# Patient Record
Sex: FEMALE | Race: WHITE | NOT HISPANIC OR LATINO | URBAN - METROPOLITAN AREA
[De-identification: names, ages, dates, MRNs, and addresses within clinical notes are randomized per-mention and may not be internally consistent; named-entity substitution may affect disease eponyms.]

---

## 2019-08-09 ENCOUNTER — APPOINTMENT (OUTPATIENT)
Dept: WOMENS IMAGING | Facility: HOSPITAL | Age: 59
End: 2019-08-09

## 2019-08-09 PROCEDURE — 77067 SCR MAMMO BI INCL CAD: CPT | Performed by: RADIOLOGY

## 2022-03-30 RX ORDER — LOSARTAN POTASSIUM 100 MG/1
TABLET ORAL
Qty: 90 TABLET | Refills: 1 | OUTPATIENT
Start: 2022-03-30

## 2022-03-30 RX ORDER — ATORVASTATIN CALCIUM 10 MG/1
TABLET, FILM COATED ORAL
Qty: 90 TABLET | Refills: 1 | OUTPATIENT
Start: 2022-03-30

## 2022-04-05 RX ORDER — AMLODIPINE BESYLATE 2.5 MG/1
TABLET ORAL
Qty: 90 TABLET | Refills: 0 | Status: SHIPPED | OUTPATIENT
Start: 2022-04-05 | End: 2022-07-13

## 2022-04-05 RX ORDER — METOPROLOL SUCCINATE 25 MG/1
TABLET, EXTENDED RELEASE ORAL
Qty: 90 TABLET | Refills: 0 | Status: SHIPPED | OUTPATIENT
Start: 2022-04-05 | End: 2022-07-13

## 2022-04-18 RX ORDER — PROCHLORPERAZINE 25 MG/1
SUPPOSITORY RECTAL
Qty: 3 EACH | Refills: 1 | Status: SHIPPED | OUTPATIENT
Start: 2022-04-18 | End: 2022-06-09 | Stop reason: SDUPTHER

## 2022-04-18 RX ORDER — LOSARTAN POTASSIUM 100 MG/1
TABLET ORAL
Qty: 90 TABLET | Refills: 1 | OUTPATIENT
Start: 2022-04-18

## 2022-05-10 RX ORDER — ALBUTEROL SULFATE 90 UG/1
2 AEROSOL, METERED RESPIRATORY (INHALATION) EVERY 4 HOURS PRN
Qty: 18 G | Refills: 2 | Status: SHIPPED | OUTPATIENT
Start: 2022-05-10 | End: 2022-07-21

## 2022-05-10 RX ORDER — ALBUTEROL SULFATE 90 UG/1
AEROSOL, METERED RESPIRATORY (INHALATION)
Qty: 6.7 G | Refills: 1 | OUTPATIENT
Start: 2022-05-10

## 2022-05-16 DIAGNOSIS — F51.01 PRIMARY INSOMNIA: Primary | ICD-10-CM

## 2022-05-17 ENCOUNTER — TELEPHONE (OUTPATIENT)
Dept: FAMILY MEDICINE CLINIC | Facility: CLINIC | Age: 62
End: 2022-05-17

## 2022-05-17 NOTE — TELEPHONE ENCOUNTER
"Looks like it was sent over to [facility] on 04/05 for 90 days. Refill is too soon. Tried calling number and it was \"not in service\"  "

## 2022-05-17 NOTE — TELEPHONE ENCOUNTER
PT CALLED IN AND NEEDS A REFILL ON AMLODIPINE 2.5MG SENT OVER TO [facility]

## 2022-05-18 RX ORDER — ZOLPIDEM TARTRATE 10 MG/1
TABLET ORAL
Qty: 30 TABLET | Refills: 0 | Status: SHIPPED | OUTPATIENT
Start: 2022-05-18 | End: 2022-06-09 | Stop reason: SDUPTHER

## 2022-06-08 DIAGNOSIS — F51.01 PRIMARY INSOMNIA: ICD-10-CM

## 2022-06-08 NOTE — TELEPHONE ENCOUNTER
Incoming Refill Request      Medication requested (name and dose):   DEXCOM TRANSMITTER  DEXCOM SENSOR   AMBIEN   Pharmacy where request should be sent: [facility]     Additional details provided by patient:   DEXCOM EXPIRES IN 1-2 DAYS      Best call back number:   [phone]    Does the patient have less than a 3 day supply:  [x] Yes  [] No    Annamarie More Rep  06/08/22, 13:38 EDT

## 2022-06-09 DIAGNOSIS — F51.01 PRIMARY INSOMNIA: ICD-10-CM

## 2022-06-09 RX ORDER — PROCHLORPERAZINE 25 MG/1
SUPPOSITORY RECTAL
Qty: 3 EACH | Refills: 1 | Status: SHIPPED | OUTPATIENT
Start: 2022-06-09 | End: 2022-10-03 | Stop reason: SDUPTHER

## 2022-06-09 RX ORDER — PROCHLORPERAZINE 25 MG/1
SUPPOSITORY RECTAL
Qty: 3 EACH | Refills: 1 | Status: SHIPPED | OUTPATIENT
Start: 2022-06-09 | End: 2022-08-09 | Stop reason: SDUPTHER

## 2022-06-09 RX ORDER — PROCHLORPERAZINE 25 MG/1
SUPPOSITORY RECTAL
Qty: 1 EACH | Refills: 1 | Status: SHIPPED | OUTPATIENT
Start: 2022-06-09 | End: 2023-02-07 | Stop reason: SDUPTHER

## 2022-06-09 RX ORDER — ZOLPIDEM TARTRATE 10 MG/1
10 TABLET ORAL
Qty: 30 TABLET | Refills: 0 | OUTPATIENT
Start: 2022-06-09

## 2022-06-09 RX ORDER — ZOLPIDEM TARTRATE 10 MG/1
10 TABLET ORAL
Qty: 30 TABLET | Refills: 0 | Status: SHIPPED | OUTPATIENT
Start: 2022-06-09 | End: 2022-06-10 | Stop reason: SDUPTHER

## 2022-06-09 NOTE — TELEPHONE ENCOUNTER
Ofe Mcfarlane, would you care to send in a refill of this patient's Ambien.  Cruz is appropriate.  We also called her pharmacy and got the dates.  I am going to be out next week so I put in that she can feel it after 6/18/2022 which is a month after her last fill.  Thanks

## 2022-06-10 DIAGNOSIS — F51.01 PRIMARY INSOMNIA: ICD-10-CM

## 2022-06-13 RX ORDER — ZOLPIDEM TARTRATE 10 MG/1
10 TABLET ORAL
Qty: 30 TABLET | Refills: 0 | Status: SHIPPED | OUTPATIENT
Start: 2022-06-13 | End: 2022-07-21

## 2022-07-13 RX ORDER — METOPROLOL SUCCINATE 25 MG/1
TABLET, EXTENDED RELEASE ORAL
Qty: 90 TABLET | Refills: 0 | Status: SHIPPED | OUTPATIENT
Start: 2022-07-13 | End: 2022-10-03

## 2022-07-13 RX ORDER — AMLODIPINE BESYLATE 2.5 MG/1
TABLET ORAL
Qty: 90 TABLET | Refills: 0 | Status: SHIPPED | OUTPATIENT
Start: 2022-07-13 | End: 2022-10-14

## 2022-07-20 DIAGNOSIS — F51.01 PRIMARY INSOMNIA: ICD-10-CM

## 2022-07-21 RX ORDER — ALBUTEROL SULFATE 90 UG/1
AEROSOL, METERED RESPIRATORY (INHALATION)
Qty: 18 G | Refills: 2 | Status: SHIPPED | OUTPATIENT
Start: 2022-07-21 | End: 2022-10-03 | Stop reason: SDUPTHER

## 2022-07-21 RX ORDER — ZOLPIDEM TARTRATE 10 MG/1
TABLET ORAL
Qty: 30 TABLET | Refills: 0 | Status: SHIPPED | OUTPATIENT
Start: 2022-07-21 | End: 2022-07-29 | Stop reason: SDUPTHER

## 2022-07-25 DIAGNOSIS — F51.01 PRIMARY INSOMNIA: ICD-10-CM

## 2022-07-25 NOTE — TELEPHONE ENCOUNTER
Caller: Stephani Ye    Relationship to patient: Self    Best call back number: [phone]    Patient is needing: PATIENT STATED [facility] [place] WILL NOT [facility]SFER THE AMBIEN MEDICATION SO THAT PROVIDER WOULD NEED TO SEND IT TO [id number]     PLEASE ADVISE

## 2022-07-27 DIAGNOSIS — F51.01 PRIMARY INSOMNIA: ICD-10-CM

## 2022-07-27 RX ORDER — ZOLPIDEM TARTRATE 10 MG/1
10 TABLET ORAL
Qty: 30 TABLET | Refills: 0 | Status: CANCELLED | OUTPATIENT
Start: 2022-07-27

## 2022-07-27 NOTE — TELEPHONE ENCOUNTER
Caller: Stephani Ye    Relationship: Self    Best call back number: [phone]    Requested Prescriptions:   Requested Prescriptions     Pending Prescriptions Disp Refills   • zolpidem (AMBIEN) 10 MG tablet 30 tablet 0     Sig: Take 1 tablet by mouth every night at bedtime.        Pharmacy where request should be sent: [facility]ACY #[id number] - [place], [place] - [address] [place] [address] [address] [facility] [phone] [facility] [phone] FX     Additional details provided by patient: PATIENT IS OUT OF THIS MEDICATION.    Does the patient have less than a 3 day supply:  [x] Yes  [] No    Annamarie Ibrahim Rep   07/27/22 13:49 EDT

## 2022-07-31 RX ORDER — ZOLPIDEM TARTRATE 10 MG/1
10 TABLET ORAL
Qty: 30 TABLET | Refills: 0 | Status: SHIPPED | OUTPATIENT
Start: 2022-07-31 | End: 2022-10-04 | Stop reason: SDUPTHER

## 2022-08-09 ENCOUNTER — TELEPHONE (OUTPATIENT)
Dept: FAMILY MEDICINE CLINIC | Facility: CLINIC | Age: 62
End: 2022-08-09

## 2022-08-09 RX ORDER — PROCHLORPERAZINE 25 MG/1
SUPPOSITORY RECTAL
Qty: 3 EACH | Refills: 1 | Status: SHIPPED | OUTPATIENT
Start: 2022-08-09

## 2022-08-09 NOTE — TELEPHONE ENCOUNTER
Caller: Stephani Ye    Relationship: Self    Best call back number: [phone]    Requested Prescriptions:   Continuous Blood Gluc Sensor (Dexcom G6 Sensor)     Pharmacy where request should be sent: [facility]/PHARMACY #[id number] - [place] [id number] [address] - [phone]  - [phone] FX     PLEASE REFILL.    Annamarie Austin Rep   08/09/22 10:35 EDT     THANK YOU.

## 2022-09-06 RX ORDER — SITAGLIPTIN 100 MG/1
TABLET, FILM COATED ORAL
Qty: 90 TABLET | Refills: 0 | Status: SHIPPED | OUTPATIENT
Start: 2022-09-06 | End: 2022-10-14

## 2022-10-02 DIAGNOSIS — F51.01 PRIMARY INSOMNIA: ICD-10-CM

## 2022-10-03 RX ORDER — ALBUTEROL SULFATE 90 UG/1
2 AEROSOL, METERED RESPIRATORY (INHALATION) EVERY 4 HOURS PRN
Qty: 18 G | Refills: 0 | Status: SHIPPED | OUTPATIENT
Start: 2022-10-03

## 2022-10-03 RX ORDER — INSULIN GLARGINE 100 [IU]/ML
INJECTION, SOLUTION SUBCUTANEOUS
Qty: 15 ML | Refills: 0 | Status: SHIPPED | OUTPATIENT
Start: 2022-10-03 | End: 2022-10-14 | Stop reason: SDUPTHER

## 2022-10-03 RX ORDER — LOSARTAN POTASSIUM 100 MG/1
TABLET ORAL
Qty: 90 TABLET | Refills: 0 | Status: SHIPPED | OUTPATIENT
Start: 2022-10-03 | End: 2022-10-03 | Stop reason: SDUPTHER

## 2022-10-03 RX ORDER — INSULIN GLARGINE 100 [IU]/ML
INJECTION, SOLUTION SUBCUTANEOUS
Qty: 9 ML | Refills: 1 | Status: SHIPPED | OUTPATIENT
Start: 2022-10-03 | End: 2022-10-03 | Stop reason: SDUPTHER

## 2022-10-03 RX ORDER — LOSARTAN POTASSIUM 100 MG/1
TABLET ORAL
Qty: 90 TABLET | OUTPATIENT
Start: 2022-10-03

## 2022-10-03 RX ORDER — METOPROLOL SUCCINATE 25 MG/1
25 TABLET, EXTENDED RELEASE ORAL DAILY
Qty: 30 TABLET | Refills: 0 | Status: SHIPPED | OUTPATIENT
Start: 2022-10-03 | End: 2022-10-14 | Stop reason: SDUPTHER

## 2022-10-03 RX ORDER — METOPROLOL SUCCINATE 25 MG/1
TABLET, EXTENDED RELEASE ORAL
Qty: 90 TABLET | Refills: 0 | Status: SHIPPED | OUTPATIENT
Start: 2022-10-03 | End: 2022-10-03 | Stop reason: SDUPTHER

## 2022-10-03 RX ORDER — LOSARTAN POTASSIUM 100 MG/1
100 TABLET ORAL DAILY
Qty: 30 TABLET | Refills: 0 | Status: SHIPPED | OUTPATIENT
Start: 2022-10-03 | End: 2022-10-14 | Stop reason: SDUPTHER

## 2022-10-03 RX ORDER — PROCHLORPERAZINE 25 MG/1
SUPPOSITORY RECTAL
Qty: 3 EACH | Refills: 1 | Status: SHIPPED | OUTPATIENT
Start: 2022-10-03 | End: 2023-02-07 | Stop reason: SDUPTHER

## 2022-10-03 RX ORDER — ATORVASTATIN CALCIUM 10 MG/1
10 TABLET, FILM COATED ORAL DAILY
Qty: 30 TABLET | Refills: 0 | Status: SHIPPED | OUTPATIENT
Start: 2022-10-03 | End: 2022-10-14 | Stop reason: SDUPTHER

## 2022-10-03 RX ORDER — ATORVASTATIN CALCIUM 10 MG/1
TABLET, FILM COATED ORAL
Qty: 90 TABLET | Refills: 1 | Status: SHIPPED | OUTPATIENT
Start: 2022-10-03 | End: 2022-10-03 | Stop reason: SDUPTHER

## 2022-10-03 NOTE — TELEPHONE ENCOUNTER
Appt scheduled 10/14/22    Needs:  Januvia needs to be changed. Was on Jardiance before and had to change it because of insurance. Now her new insurance doesn't want to cover Januvia. Can you prescribe something else?

## 2022-10-04 ENCOUNTER — TELEPHONE (OUTPATIENT)
Dept: FAMILY MEDICINE CLINIC | Facility: CLINIC | Age: 62
End: 2022-10-04

## 2022-10-04 DIAGNOSIS — F51.01 PRIMARY INSOMNIA: ICD-10-CM

## 2022-10-04 RX ORDER — ZOLPIDEM TARTRATE 10 MG/1
10 TABLET ORAL
Qty: 30 TABLET | Refills: 0 | Status: SHIPPED | OUTPATIENT
Start: 2022-10-04 | End: 2023-01-05 | Stop reason: SDUPTHER

## 2022-10-04 NOTE — TELEPHONE ENCOUNTER
Ofe Mcfarlane, you have been filling this.  Last UDS was in March but she has a follow-up appointment with me in 2 weeks.  We called and got dates of her Ambien refills and its appropriate.  Would you care to send a month's refill.  Thanks

## 2022-10-04 NOTE — TELEPHONE ENCOUNTER
Ambien fill dates    8-29-22 30 day- Dr Mfcarlane    7-30-22 30 day- Dr Mcfarlane    6-17-22 30 day- Dr Mcfarlane

## 2022-10-14 ENCOUNTER — TELEMEDICINE (OUTPATIENT)
Dept: FAMILY MEDICINE CLINIC | Facility: CLINIC | Age: 62
End: 2022-10-14

## 2022-10-14 DIAGNOSIS — Z79.4 TYPE 2 DIABETES MELLITUS WITH HYPERGLYCEMIA, WITH LONG-TERM CURRENT USE OF INSULIN: Primary | ICD-10-CM

## 2022-10-14 DIAGNOSIS — E78.2 MIXED HYPERLIPIDEMIA: ICD-10-CM

## 2022-10-14 DIAGNOSIS — F51.01 PRIMARY INSOMNIA: ICD-10-CM

## 2022-10-14 DIAGNOSIS — Z79.899 ENCOUNTER FOR LONG-TERM (CURRENT) USE OF OTHER MEDICATIONS: ICD-10-CM

## 2022-10-14 DIAGNOSIS — I10 BENIGN ESSENTIAL HTN: ICD-10-CM

## 2022-10-14 DIAGNOSIS — E11.65 TYPE 2 DIABETES MELLITUS WITH HYPERGLYCEMIA, WITH LONG-TERM CURRENT USE OF INSULIN: Primary | ICD-10-CM

## 2022-10-14 PROBLEM — G47.00 INSOMNIA: Status: ACTIVE | Noted: 2022-10-14

## 2022-10-14 RX ORDER — INSULIN GLARGINE 100 [IU]/ML
INJECTION, SOLUTION SUBCUTANEOUS
Qty: 15 ML | Refills: 2 | Status: SHIPPED | OUTPATIENT
Start: 2022-10-14 | End: 2022-12-29 | Stop reason: SDUPTHER

## 2022-10-14 RX ORDER — ATORVASTATIN CALCIUM 10 MG/1
10 TABLET, FILM COATED ORAL DAILY
Qty: 90 TABLET | Refills: 1 | Status: SHIPPED | OUTPATIENT
Start: 2022-10-14 | End: 2023-01-05 | Stop reason: SDUPTHER

## 2022-10-14 RX ORDER — METOPROLOL SUCCINATE 25 MG/1
25 TABLET, EXTENDED RELEASE ORAL DAILY
Qty: 90 TABLET | Refills: 1 | Status: SHIPPED | OUTPATIENT
Start: 2022-10-14 | End: 2023-01-05 | Stop reason: SDUPTHER

## 2022-10-14 RX ORDER — LOSARTAN POTASSIUM 100 MG/1
100 TABLET ORAL DAILY
Qty: 90 TABLET | Refills: 1 | Status: SHIPPED | OUTPATIENT
Start: 2022-10-14 | End: 2023-01-05 | Stop reason: SDUPTHER

## 2022-10-14 NOTE — ASSESSMENT & PLAN NOTE
Meds refilled.  We will try Tradjenta in place of the Januvia to see if insurance will cover this.  She will let me know if it is too expensive so that we can then possibly run a PA or try to find out what her insurance will cover.  Proper diet and exercise plan discussed and encouraged.  Annual dental and eye exams encouraged.  Check feet daily.  Goal blood pressure less than 140/90.  Let me know if gets to or above that.  Patient know she is due routine blood work.  States she will be back here in town in a couple weeks and will come by the office then to do blood work and to leave a urine sample that also includes a UDS since she is on Ambien.  Education provided.  Return to clinic or ED with any issues or concerns.

## 2022-10-14 NOTE — PROGRESS NOTES
You have chosen to receive care through a telehealth visit.  Do you consent to use a video/audio connection for your medical care today? Yes     This was an audio and video enabled telemedicine encounter.     Chief Complaint  Med Refill, Hypertension, Hyperlipidemia, Insomnia, and Diabetes    Subjective          Stephani Ye presents to [facility]  History of Present Illness     Patient scheduled virtual appointment for med refills.  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  She is on Ambien and she knows she is due for UDS and states she will be here around Halloween time so she will come back into the office to leave a urine sample for UDS and to also do fasting blood work.  Doing well on medications with no issues.  No dizziness no headache no chest pain no chest pressure no shortness of breath.    States blood pressure well controlled only on losartan and metoprolol so she is not taking the amlodipine.    States due to insurance issues that she has been off Januvia for about a week as she states it is too expensive.  She is asking if there is an alternative we can try that is cheaper.  She states her blood sugars are doing okay.  She was unable to give an exact number.  She is on 60 units of Lantus daily.  No feet issues.  States we discussed all preventative measures at a later appointment.    Objective   Vital Signs:   There were no vitals taken for this visit.    There is no height or weight on file to calculate BMI.    Review of Systems   Constitutional: Negative for chills, fatigue and fever.   Eyes: Negative for visual disturbance.   Respiratory: Negative for cough, shortness of breath and wheezing.    Cardiovascular: Negative for chest pain and palpitations.   Gastrointestinal: Negative for abdominal pain, constipation, diarrhea, nausea and vomiting.   Genitourinary: Negative for decreased urine volume, dysuria, frequency, hematuria and urgency.    Musculoskeletal: Negative for back pain.   Skin: Negative for rash and skin lesions.   Neurological: Negative for dizziness and headache.   Psychiatric/Behavioral: Negative.  Negative for suicidal ideas.       Past History:  Medical History: has a past medical history of Carpal tunnel syndrome (), Elevated blood pressure reading, Emphysema, unspecified (HCC), Glaucoma, and Pregnancy.   Surgical History: has a past surgical history that includes Carpal tunnel release (); Tubal ligation; Tonsillectomy; Hysterectomy; Cosmetic surgery;  section; and Breast surgery.   Family History: family history includes Cancer in an other family member; Coronary artery disease in her mother and another family member; Depression in her sister and another family member; Diabetes in her brother; Hypertension in her brother and mother; Osteoarthritis in her mother; Peripheral vascular disease in her mother.   Social History: reports that she has been smoking cigarettes. She has a 30.00 pack-year smoking history. She has never used smokeless tobacco. She reports current alcohol use. Drug use questions deferred to the physician.    PHQ-2 Depression Screening  Little interest or pleasure in doing things? 0-->not at all   Feeling down, depressed, or hopeless? 0-->not at all   PHQ-2 Total Score 0        PHQ-9 Depression Screening  Little interest or pleasure in doing things? 0-->not at all   Feeling down, depressed, or hopeless? 0-->not at all   Trouble falling or staying asleep, or sleeping too much?     Feeling tired or having little energy?     Poor appetite or overeating?     Feeling bad about yourself - or that you are a failure or have let yourself or your family down?     Trouble concentrating on things, such as reading the newspaper or watching television?     Moving or speaking so slowly that other people could have noticed? Or the opposite - being so fidgety or restless that you have been moving around a lot more  than usual?     Thoughts that you would be better off dead, or of hurting yourself in some way?     PHQ-9 Total Score 0   If you checked off any problems, how difficult have these problems made it for you to do your work, take care of things at home, or get along with other people?       PHQ-9 Total Score: 0      Patient screened positive for depression based on a PHQ-9 score of 0 on 10/14/2022. Follow-up recommendations include:       Current Outpatient Medications:   •  albuterol sulfate  (90 Base) MCG/ACT inhaler, Inhale 2 puffs Every 4 (Four) Hours As Needed for Wheezing., Disp: 18 g, Rfl: 0  •  atorvastatin (LIPITOR) 10 MG tablet, Take 1 tablet by mouth Daily., Disp: 90 tablet, Rfl: 1  •  Continuous Blood Gluc Sensor (Dexcom G6 Sensor), Check glucose 3-4 times daily and PRN; E11.9, Disp: 3 each, Rfl: 1  •  Continuous Blood Gluc Sensor (Dexcom G6 Sensor), Change sensor every 10 days, Disp: 3 each, Rfl: 1  •  Continuous Blood Gluc Transmit (Dexcom G6 Transmitter) misc, CHECK BLOOD SUGAR DAILY, Disp: 1 each, Rfl: 1  •  Insulin Glargine (Lantus SoloStar) 100 UNIT/ML injection pen, Inject 60 units daily subcutaneously as per insulin protocol every morning, Disp: 15 mL, Rfl: 2  •  losartan (COZAAR) 100 MG tablet, Take 1 tablet by mouth Daily., Disp: 90 tablet, Rfl: 1  •  metoprolol succinate XL (TOPROL-XL) 25 MG 24 hr tablet, Take 1 tablet by mouth Daily., Disp: 90 tablet, Rfl: 1  •  zolpidem (AMBIEN) 10 MG tablet, Take 1 tablet by mouth every night at bedtime., Disp: 30 tablet, Rfl: 0  •  linagliptin (Tradjenta) 5 MG tablet tablet, Take 1 tablet by mouth Daily., Disp: 90 tablet, Rfl: 1   (Not in a hospital admission)     Allergies: Patient has no known allergies.    Physical Exam  Constitutional:       Appearance: Normal appearance.   Pulmonary:      Effort: Pulmonary effort is normal.   Neurological:      General: No focal deficit present.      Mental Status: She is alert and oriented to person, place, and  time. Mental status is at baseline.   Psychiatric:         Mood and Affect: Mood normal.         Behavior: Behavior normal.         Thought Content: Thought content normal.         Judgment: Judgment normal.          Result Review :                   Assessment and Plan    Diagnoses and all orders for this visit:    1. Type 2 diabetes mellitus with hyperglycemia, with long-term current use of insulin (HCC) (Primary)  Assessment & Plan:  Meds refilled.  We will try Tradjenta in place of the Januvia to see if insurance will cover this.  She will let me know if it is too expensive so that we can then possibly run a PA or try to find out what her insurance will cover.  Proper diet and exercise plan discussed and encouraged.  Annual dental and eye exams encouraged.  Check feet daily.  Goal blood pressure less than 140/90.  Let me know if gets to or above that.  Patient know she is due routine blood work.  States she will be back here in town in a couple weeks and will come by the office then to do blood work and to leave a urine sample that also includes a UDS since she is on Ambien.  Education provided.  Return to clinic or ED with any issues or concerns.    Orders:  -     Hemoglobin A1c; Future  -     POC Microalbumin; Future  -     linagliptin (Tradjenta) 5 MG tablet tablet; Take 1 tablet by mouth Daily.  Dispense: 90 tablet; Refill: 1  -     Insulin Glargine (Lantus SoloStar) 100 UNIT/ML injection pen; Inject 60 units daily subcutaneously as per insulin protocol every morning  Dispense: 15 mL; Refill: 2    2. Mixed hyperlipidemia  -     Lipid Panel; Future  -     atorvastatin (LIPITOR) 10 MG tablet; Take 1 tablet by mouth Daily.  Dispense: 90 tablet; Refill: 1    3. Benign essential HTN  -     CBC & Differential; Future  -     Comprehensive Metabolic Panel; Future  -     TSH Rfx On Abnormal To Free T4; Future  -     POC Urinalysis Dipstick, Automated; Future  -     losartan (COZAAR) 100 MG tablet; Take 1 tablet by  mouth Daily.  Dispense: 90 tablet; Refill: 1  -     metoprolol succinate XL (TOPROL-XL) 25 MG 24 hr tablet; Take 1 tablet by mouth Daily.  Dispense: 90 tablet; Refill: 1    4. Encounter for long-term (current) use of other medications  -     POC Urine Drug Screen, Triage; Future    5. Primary insomnia                   Follow Up   Return in about 3 months (around 1/14/2023).  Patient was given instructions and counseling regarding her condition or for health maintenance advice. Please see specific information pulled into the AVS if appropriate.     RYLEY Long

## 2022-11-02 RX ORDER — FLUTICASONE PROPIONATE AND SALMETEROL 250; 50 UG/1; UG/1
POWDER RESPIRATORY (INHALATION)
Qty: 1 EACH | Refills: 5 | Status: SHIPPED | OUTPATIENT
Start: 2022-11-02 | End: 2023-01-05 | Stop reason: SDUPTHER

## 2022-12-29 DIAGNOSIS — Z79.4 TYPE 2 DIABETES MELLITUS WITH HYPERGLYCEMIA, WITH LONG-TERM CURRENT USE OF INSULIN: ICD-10-CM

## 2022-12-29 DIAGNOSIS — E11.65 TYPE 2 DIABETES MELLITUS WITH HYPERGLYCEMIA, WITH LONG-TERM CURRENT USE OF INSULIN: ICD-10-CM

## 2022-12-29 NOTE — TELEPHONE ENCOUNTER
Caller: MIO GERBER     Relationship: PATIENT     Best call back number: [phone]    Requested Prescriptions:   Requested Prescriptions     Pending Prescriptions Disp Refills   • Insulin Glargine (Lantus SoloStar) 100 UNIT/ML injection pen 15 mL 2     Sig: Inject 60 units daily subcutaneously as per insulin protocol every morning        Pharmacy where request should be sent: [facility]/PHARMACY #[id number] - [place], [place] - [address] [place] [address] [address] [facility] [phone] [facility] [phone] FX     Additional details provided by patient:     Does the patient have less than a 3 day supply:  [x] Yes  [] No    SiAnnamarie Mitchell Rep   12/29/22 13:46 EST

## 2022-12-30 DIAGNOSIS — Z79.4 TYPE 2 DIABETES MELLITUS WITH HYPERGLYCEMIA, WITH LONG-TERM CURRENT USE OF INSULIN: ICD-10-CM

## 2022-12-30 DIAGNOSIS — E11.65 TYPE 2 DIABETES MELLITUS WITH HYPERGLYCEMIA, WITH LONG-TERM CURRENT USE OF INSULIN: ICD-10-CM

## 2022-12-30 RX ORDER — INSULIN GLARGINE 100 [IU]/ML
INJECTION, SOLUTION SUBCUTANEOUS
Qty: 15 ML | Refills: 2 | Status: SHIPPED | OUTPATIENT
Start: 2022-12-30 | End: 2023-01-05 | Stop reason: SDUPTHER

## 2022-12-31 DIAGNOSIS — I10 BENIGN ESSENTIAL HTN: ICD-10-CM

## 2023-01-03 RX ORDER — LOSARTAN POTASSIUM 100 MG/1
TABLET ORAL
Qty: 90 TABLET | Refills: 1 | OUTPATIENT
Start: 2023-01-03

## 2023-01-03 RX ORDER — INSULIN GLARGINE 100 [IU]/ML
INJECTION, SOLUTION SUBCUTANEOUS
OUTPATIENT
Start: 2023-01-03

## 2023-01-05 ENCOUNTER — OFFICE VISIT (OUTPATIENT)
Dept: FAMILY MEDICINE CLINIC | Facility: CLINIC | Age: 63
End: 2023-01-05

## 2023-01-05 ENCOUNTER — PATIENT MESSAGE (OUTPATIENT)
Dept: FAMILY MEDICINE CLINIC | Facility: CLINIC | Age: 63
End: 2023-01-05

## 2023-01-05 VITALS
HEART RATE: 99 BPM | WEIGHT: 147.44 LBS | OXYGEN SATURATION: 96 % | HEIGHT: 64 IN | DIASTOLIC BLOOD PRESSURE: 86 MMHG | BODY MASS INDEX: 25.17 KG/M2 | TEMPERATURE: 98.7 F | SYSTOLIC BLOOD PRESSURE: 118 MMHG

## 2023-01-05 DIAGNOSIS — R82.90 NONSPECIFIC FINDING ON EXAMINATION OF URINE: ICD-10-CM

## 2023-01-05 DIAGNOSIS — Z79.4 TYPE 2 DIABETES MELLITUS WITH HYPERGLYCEMIA, WITH LONG-TERM CURRENT USE OF INSULIN: ICD-10-CM

## 2023-01-05 DIAGNOSIS — E11.65 TYPE 2 DIABETES MELLITUS WITH HYPERGLYCEMIA, WITH LONG-TERM CURRENT USE OF INSULIN: ICD-10-CM

## 2023-01-05 DIAGNOSIS — Z72.0 TOBACCO USE: ICD-10-CM

## 2023-01-05 DIAGNOSIS — Z12.11 SCREENING FOR COLON CANCER: ICD-10-CM

## 2023-01-05 DIAGNOSIS — Z00.00 ANNUAL PHYSICAL EXAM: Primary | ICD-10-CM

## 2023-01-05 DIAGNOSIS — F51.01 PRIMARY INSOMNIA: ICD-10-CM

## 2023-01-05 DIAGNOSIS — Z12.4 SCREENING FOR CERVICAL CANCER: ICD-10-CM

## 2023-01-05 DIAGNOSIS — Z79.899 ENCOUNTER FOR LONG-TERM (CURRENT) USE OF OTHER MEDICATIONS: ICD-10-CM

## 2023-01-05 DIAGNOSIS — H40.9 GLAUCOMA, UNSPECIFIED GLAUCOMA TYPE, UNSPECIFIED LATERALITY: ICD-10-CM

## 2023-01-05 DIAGNOSIS — I10 BENIGN ESSENTIAL HTN: ICD-10-CM

## 2023-01-05 DIAGNOSIS — E78.2 MIXED HYPERLIPIDEMIA: ICD-10-CM

## 2023-01-05 DIAGNOSIS — Z12.2 SCREENING FOR LUNG CANCER: ICD-10-CM

## 2023-01-05 DIAGNOSIS — J44.9 CHRONIC OBSTRUCTIVE PULMONARY DISEASE, UNSPECIFIED COPD TYPE: ICD-10-CM

## 2023-01-05 DIAGNOSIS — J01.00 ACUTE NON-RECURRENT MAXILLARY SINUSITIS: ICD-10-CM

## 2023-01-05 LAB
BILIRUB BLD-MCNC: NEGATIVE MG/DL
CLARITY, POC: CLEAR
COLOR UR: YELLOW
EXPIRATION DATE: ABNORMAL
GLUCOSE UR STRIP-MCNC: ABNORMAL MG/DL
KETONES UR QL: NEGATIVE
LEUKOCYTE EST, POC: NEGATIVE
Lab: ABNORMAL
NITRITE UR-MCNC: NEGATIVE MG/ML
PH UR: 7.5 [PH] (ref 5–8)
POC AMPHETAMINES: NEGATIVE
POC BARBITURATES: NEGATIVE
POC BENZODIAZEPHINES: NEGATIVE
POC COCAINE: NEGATIVE
POC METHADONE: NEGATIVE
POC METHAMPHETAMINE SCREEN URINE: NEGATIVE
POC MICROALBUMIN URINE: 50
POC OPIATES: NEGATIVE
POC OXYCODONE: NEGATIVE
POC PHENCYCLIDINE: NEGATIVE
POC PROPOXYPHENE: NEGATIVE
POC THC: NEGATIVE
POC TRICYCLIC ANTIDEPRESSANTS: NEGATIVE
PROT UR STRIP-MCNC: ABNORMAL MG/DL
RBC # UR STRIP: NEGATIVE /UL
SP GR UR: 1.01 (ref 1–1.03)
UROBILINOGEN UR QL: NORMAL

## 2023-01-05 PROCEDURE — 99213 OFFICE O/P EST LOW 20 MIN: CPT | Performed by: NURSE PRACTITIONER

## 2023-01-05 PROCEDURE — 80305 DRUG TEST PRSMV DIR OPT OBS: CPT | Performed by: NURSE PRACTITIONER

## 2023-01-05 PROCEDURE — 81003 URINALYSIS AUTO W/O SCOPE: CPT | Performed by: NURSE PRACTITIONER

## 2023-01-05 PROCEDURE — 99396 PREV VISIT EST AGE 40-64: CPT | Performed by: NURSE PRACTITIONER

## 2023-01-05 PROCEDURE — 82044 UR ALBUMIN SEMIQUANTITATIVE: CPT | Performed by: NURSE PRACTITIONER

## 2023-01-05 PROCEDURE — 36415 COLL VENOUS BLD VENIPUNCTURE: CPT | Performed by: NURSE PRACTITIONER

## 2023-01-05 RX ORDER — METOPROLOL SUCCINATE 25 MG/1
25 TABLET, EXTENDED RELEASE ORAL DAILY
Qty: 90 TABLET | Refills: 1 | Status: SHIPPED | OUTPATIENT
Start: 2023-01-05

## 2023-01-05 RX ORDER — AMOXICILLIN AND CLAVULANATE POTASSIUM 875; 125 MG/1; MG/1
1 TABLET, FILM COATED ORAL 2 TIMES DAILY
Qty: 20 TABLET | Refills: 0 | Status: SHIPPED | OUTPATIENT
Start: 2023-01-05 | End: 2023-01-15

## 2023-01-05 RX ORDER — DORZOLAMIDE HYDROCHLORIDE AND TIMOLOL MALEATE 20; 5 MG/ML; MG/ML
SOLUTION/ DROPS OPHTHALMIC EVERY 12 HOURS
COMMUNITY

## 2023-01-05 RX ORDER — ZOLPIDEM TARTRATE 10 MG/1
10 TABLET ORAL
Qty: 30 TABLET | Refills: 2 | Status: SHIPPED | OUTPATIENT
Start: 2023-01-05

## 2023-01-05 RX ORDER — LOSARTAN POTASSIUM 100 MG/1
100 TABLET ORAL DAILY
Qty: 90 TABLET | Refills: 1 | Status: SHIPPED | OUTPATIENT
Start: 2023-01-05

## 2023-01-05 RX ORDER — INSULIN GLARGINE 100 [IU]/ML
INJECTION, SOLUTION SUBCUTANEOUS
Qty: 15 ML | Refills: 2 | Status: SHIPPED | OUTPATIENT
Start: 2023-01-05

## 2023-01-05 RX ORDER — FLUTICASONE PROPIONATE AND SALMETEROL 250; 50 UG/1; UG/1
1 POWDER RESPIRATORY (INHALATION)
Qty: 1 EACH | Refills: 5 | Status: SHIPPED | OUTPATIENT
Start: 2023-01-05

## 2023-01-05 RX ORDER — ATORVASTATIN CALCIUM 10 MG/1
10 TABLET, FILM COATED ORAL DAILY
Qty: 90 TABLET | Refills: 1 | Status: SHIPPED | OUTPATIENT
Start: 2023-01-05

## 2023-01-05 NOTE — ASSESSMENT & PLAN NOTE
TUAN and Cruz appropriate.  Controlled substance contract signed.  We will asked Dr. Garcia to send refill.  Risk discussed and understood.  Education provided.  Return to clinic or ED with any issues or concerns.

## 2023-01-05 NOTE — ASSESSMENT & PLAN NOTE
Meds refilled.  Labs drawn.  Goal blood pressure less than 140/90.  Let me know if gets to or above that.  Proper diet and exercise plan discussed and encouraged.  Risk of meds discussed understood.  Return to clinic or ED with any issues or concerns.

## 2023-01-05 NOTE — ASSESSMENT & PLAN NOTE
Fasting labs drawn.  UA completed.  Micro completed.  Goal blood pressure less than 140/90.  Let me know if gets to about that.  PHQ-9 completed and discussed.  Annual dental and eye exams encouraged.  Meds refilled.  Patient will discuss further immunizations such as shingles tetanus COVID and pneumonia vaccines with her pharmacist.  Again, she denies all preventative measures including mammogram Pap smear and low-dose lung CT scan and she understands the risk of not doing these things.  She will let me know when she wants to do them.  Proper diet and exercise plan discussed and encouraged. Pt is fit for work and free of infectious disease (states she has had a negative TB quantiferon gold recently)   Return to clinic or ED with any issues or concerns.

## 2023-01-05 NOTE — ASSESSMENT & PLAN NOTE
Meds refilled.  Labs drawn.  Proper diet and exercise plan discussed and encouraged.  Risk of meds discussed understood.  Return to clinic or ED with any issues or concerns.

## 2023-01-05 NOTE — PROGRESS NOTES
Chief Complaint  Annual Exam (Cold symptoms)    Subjective          Stephani Ye presents to [facility] for preventative yearly exam.   History of Present Illness     Patient presents for annual exam.  Continues to smoke and states less than a pack a day.  No alcohol use.  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  Tries to watch her diet and she does stay active.  Past medical history of COPD, hypertension, hyperlipidemia, type 2 diabetes, glaucoma, and insomnia.  States doing well on current medications.  Requesting refills.  No dizziness no headache no chest pain no chest pressure no shortness of breath no trouble breathing no urinary or bowel issues.    For diabetes she is on metformin Tradjenta and 60 units of Lantus.  She wears a Dexcom sensor and states her blood sugars seem to be all over the place.  She is concerned that she may end up needing mealtime insulin but wants to wait and get lab results back before making that determination.  States she is up-to-date on eye exams.  States her feet are fine with no cuts or sores with full sensation.    Colonoscopy completed in 2020 and was good for 10 years.  She is due a mammogram Pap smear and low-dose lung CT scan.  She denies doing any of these things at this time and understands the risk of not doing them.  She states she has had 3 COVID shots she has had this years flu shot and she knows that she is due a shingles and a tetanus vaccine.  States she has had a pneumonia vaccine.  States she will discuss immunizations further with her pharmacist.    She states in the near future she will have different insurance which she is wanting to wait until she gets different insurance to move forward with these preventative screenings.    States for the past week she has been battling a  sinus infection.  States nasal congestion and nasal pressure.  No fever no chills no body aches.  Denies COVID testing.  No sick contacts that she is aware of.  States mild cough only occasionally but states it is just from the drainage.            Objective   Vital Signs:   /86   Pulse 99   Temp 98.7 °F (37.1 °C)   Ht 162.6 cm (64\")   Wt 66.9 kg (147 lb 7 oz)   SpO2 96%   BMI 25.31 kg/m²     Body mass index is 25.31 kg/m².    Predictive Model Details   No score data available for Risk of Fall        PHQ-9 Depression Screening  Little interest or pleasure in doing things? 0-->not at all   Feeling down, depressed, or hopeless? 0-->not at all   Trouble falling or staying asleep, or sleeping too much?     Feeling tired or having little energy?     Poor appetite or overeating?     Feeling bad about yourself - or that you are a failure or have let yourself or your family down?     Trouble concentrating on things, such as reading the newspaper or watching television?     Moving or speaking so slowly that other people could have noticed? Or the opposite - being so fidgety or restless that you have been moving around a lot more than usual?     Thoughts that you would be better off dead, or of hurting yourself in some way?     PHQ-9 Total Score 0   If you checked off any problems, how difficult have these problems made it for you to do your work, take care of things at home, or get along with other people?       Patient screened positive for depression based on a PHQ-9 score of 0 on 1/5/2023. Follow-up recommendations include:     Immunization History   Administered Date(s) Administered   • Influenza, Unspecified 10/07/2019, 09/30/2022       Review of Systems   Constitutional: Negative for chills, fatigue, fever, unexpected weight gain and unexpected weight loss.   HENT: Positive for congestion and sinus pressure. Negative for postnasal drip, rhinorrhea, sneezing, sore throat, swollen glands and trouble  swallowing.    Eyes: Negative for visual disturbance.   Respiratory: Negative.  Negative for cough, shortness of breath and wheezing.    Cardiovascular: Negative.    Gastrointestinal: Negative.    Endocrine: Negative for polydipsia, polyphagia and polyuria.   Genitourinary: Negative for decreased urine volume, dysuria, frequency, hematuria and urgency.   Musculoskeletal: Negative for arthralgias and back pain.   Skin: Negative for rash.   Neurological: Negative.    Psychiatric/Behavioral: Negative.        Past History:  Medical History: has a past medical history of Carpal tunnel syndrome (), Elevated blood pressure reading, Emphysema, unspecified (HCC), Glaucoma, and Pregnancy.   Surgical History: has a past surgical history that includes Carpal tunnel release (); Tubal ligation; Tonsillectomy; Hysterectomy; Cosmetic surgery;  section; and Breast surgery.   Family History: family history includes Cancer in an other family member; Coronary artery disease in her mother and another family member; Depression in her sister and another family member; Diabetes in her brother; Hypertension in her brother and mother; Osteoarthritis in her mother; Peripheral vascular disease in her mother.   Social History: reports that she has been smoking cigarettes. She has a 30.00 pack-year smoking history. She has never used smokeless tobacco. She reports current alcohol use. Drug use questions deferred to the physician.      Current Outpatient Medications:   •  albuterol sulfate  (90 Base) MCG/ACT inhaler, Inhale 2 puffs Every 4 (Four) Hours As Needed for Wheezing., Disp: 18 g, Rfl: 0  •  atorvastatin (LIPITOR) 10 MG tablet, Take 1 tablet by mouth Daily., Disp: 90 tablet, Rfl: 1  •  Continuous Blood Gluc Sensor (Dexcom G6 Sensor), Check glucose 3-4 times daily and PRN; E11.9, Disp: 3 each, Rfl: 1  •  Continuous Blood Gluc Sensor (Dexcom G6 Sensor), Change sensor every 10 days, Disp: 3 each, Rfl: 1  •  Continuous  Blood Gluc Transmit (Dexcom G6 Transmitter) misc, CHECK BLOOD SUGAR DAILY, Disp: 1 each, Rfl: 1  •  dorzolamide-timolol (COSOPT) 22.3-6.8 MG/ML ophthalmic solution, Apply  to eye(s) as directed by provider Every 12 (Twelve) Hours., Disp: , Rfl:   •  Fluticasone-Salmeterol (Wixela Inhub) 250-50 MCG/ACT DISKUS, Inhale 1 puff 2 (Two) Times a Day., Disp: 1 each, Rfl: 5  •  Insulin Glargine (Lantus SoloStar) 100 UNIT/ML injection pen, Inject 60 units daily subcutaneously as per insulin protocol every morning, Disp: 15 mL, Rfl: 2  •  linagliptin (Tradjenta) 5 MG tablet tablet, Take 1 tablet by mouth Daily., Disp: 90 tablet, Rfl: 1  •  losartan (COZAAR) 100 MG tablet, Take 1 tablet by mouth Daily., Disp: 90 tablet, Rfl: 1  •  metFORMIN (GLUCOPHAGE) 1000 MG tablet, Take 1 tablet by mouth 2 (Two) Times a Day With Meals., Disp: 180 tablet, Rfl: 1  •  metoprolol succinate XL (TOPROL-XL) 25 MG 24 hr tablet, Take 1 tablet by mouth Daily., Disp: 90 tablet, Rfl: 1  •  amoxicillin-clavulanate (Augmentin) 875-125 MG per tablet, Take 1 tablet by mouth 2 (Two) Times a Day for 10 days., Disp: 20 tablet, Rfl: 0  •  zolpidem (AMBIEN) 10 MG tablet, Take 1 tablet by mouth every night at bedtime., Disp: 30 tablet, Rfl: 2   Allergies: Patient has no known allergies.    Physical Exam  Constitutional:       Appearance: Normal appearance.   HENT:      Head: Normocephalic.      Right Ear: Tympanic membrane, ear canal and external ear normal.      Left Ear: Tympanic membrane, ear canal and external ear normal.      Nose: Congestion present.      Comments: Maxillary sinuses tender bilaterally     Mouth/Throat:      Mouth: Mucous membranes are moist.      Pharynx: Oropharynx is clear.   Eyes:      Extraocular Movements: Extraocular movements intact.      Conjunctiva/sclera: Conjunctivae normal.      Pupils: Pupils are equal, round, and reactive to light.   Cardiovascular:      Rate and Rhythm: Normal rate and regular rhythm.      Heart sounds:  Normal heart sounds.   Pulmonary:      Effort: Pulmonary effort is normal.      Breath sounds: Normal breath sounds.   Abdominal:      General: Abdomen is flat. Bowel sounds are normal.      Palpations: Abdomen is soft.   Musculoskeletal:         General: Normal range of motion.      Cervical back: Normal range of motion.   Skin:     General: Skin is warm.   Neurological:      General: No focal deficit present.      Mental Status: She is alert and oriented to person, place, and time. Mental status is at baseline.   Psychiatric:         Mood and Affect: Mood normal.         Behavior: Behavior normal.         Thought Content: Thought content normal.         Judgment: Judgment normal.          Result Review :                     Assessment and Plan    Diagnoses and all orders for this visit:    1. Annual physical exam (Primary)  Assessment & Plan:  Fasting labs drawn.  UA completed.  Micro completed.  Goal blood pressure less than 140/90.  Let me know if gets to about that.  PHQ-9 completed and discussed.  Annual dental and eye exams encouraged.  Meds refilled.  Patient will discuss further immunizations such as shingles tetanus COVID and pneumonia vaccines with her pharmacist.  Again, she denies all preventative measures including mammogram Pap smear and low-dose lung CT scan and she understands the risk of not doing these things.  She will let me know when she wants to do them.  Proper diet and exercise plan discussed and encouraged. Pt is fit for work and free of infectious disease (states she has had a negative TB quantiferon gold recently)   Return to clinic or ED with any issues or concerns.    Orders:  -     CBC & Differential; Future  -     Comprehensive Metabolic Panel; Future  -     TSH Rfx On Abnormal To Free T4; Future  -     POC Urinalysis Dipstick, Automated; Future  -     CBC & Differential  -     Comprehensive Metabolic Panel  -     TSH Rfx On Abnormal To Free T4  -     POC Urinalysis Dipstick,  Automated    2. Benign essential HTN  Assessment & Plan:  Meds refilled.  Labs drawn.  Goal blood pressure less than 140/90.  Let me know if gets to or above that.  Proper diet and exercise plan discussed and encouraged.  Risk of meds discussed understood.  Return to clinic or ED with any issues or concerns.    Orders:  -     losartan (COZAAR) 100 MG tablet; Take 1 tablet by mouth Daily.  Dispense: 90 tablet; Refill: 1  -     metoprolol succinate XL (TOPROL-XL) 25 MG 24 hr tablet; Take 1 tablet by mouth Daily.  Dispense: 90 tablet; Refill: 1    3. Mixed hyperlipidemia  Assessment & Plan:  Meds refilled.  Labs drawn.  Proper diet and exercise plan discussed and encouraged.  Risk of meds discussed understood.  Return to clinic or ED with any issues or concerns.    Orders:  -     Lipid Panel; Future  -     Lipid Panel  -     atorvastatin (LIPITOR) 10 MG tablet; Take 1 tablet by mouth Daily.  Dispense: 90 tablet; Refill: 1    4. Type 2 diabetes mellitus with hyperglycemia, with long-term current use of insulin (HCC)  Assessment & Plan:  Meds refilled.  Labs drawn.  Micro completed.  Check feet daily.  Annual eye exams encouraged.  Proper diet and exercise plan discussed and encouraged.  Risk of meds discussed understood.  Return to clinic or ED with any issues or concerns.    Orders:  -     Hemoglobin A1c; Future  -     POC Microalbumin; Future  -     Hemoglobin A1c  -     POC Microalbumin  -     Insulin Glargine (Lantus SoloStar) 100 UNIT/ML injection pen; Inject 60 units daily subcutaneously as per insulin protocol every morning  Dispense: 15 mL; Refill: 2  -     linagliptin (Tradjenta) 5 MG tablet tablet; Take 1 tablet by mouth Daily.  Dispense: 90 tablet; Refill: 1  -     metFORMIN (GLUCOPHAGE) 1000 MG tablet; Take 1 tablet by mouth 2 (Two) Times a Day With Meals.  Dispense: 180 tablet; Refill: 1    5. Glaucoma, unspecified glaucoma type, unspecified laterality  Assessment & Plan:  Continue follow-up with  optometrist as scheduled.      6. Encounter for long-term (current) use of other medications  -     POC Urine Drug Screen, Triage; Future  -     POC Urine Drug Screen, Triage    7. Primary insomnia  Assessment & Plan:  UDS and Cruz appropriate.  Controlled substance contract signed.  We will asked Dr. Garcia to send refill.  Risk discussed and understood.  Education provided.  Return to clinic or ED with any issues or concerns.      8. Screening for colon cancer    9. Screening for cervical cancer    10. Chronic obstructive pulmonary disease, unspecified COPD type (HCC)  -     Fluticasone-Salmeterol (Wixela Inhub) 250-50 MCG/ACT DISKUS; Inhale 1 puff 2 (Two) Times a Day.  Dispense: 1 each; Refill: 5    11. Tobacco use  Assessment & Plan:  Smoking cessation discussed and encouraged.      12. Screening for lung cancer    13. Nonspecific finding on examination of urine  -     Urine Culture - Urine, Urine, Clean Catch; Future  -     Urine Culture - Urine, Urine, Clean Catch    14. Acute non-recurrent maxillary sinusitis  Assessment & Plan:  Antibiotic as prescribed.  Tylenol and ibuprofen PRN Pain fever.  Continue with allergy meds and Flonase.  Fluids and rest encouraged.  Risk of meds discussed and understood.  Patient denied COVID testing.  Return in 2 days if no improvement, sooner if worsens.  Return to clinic or ED with any issues or concerns.    Orders:  -     amoxicillin-clavulanate (Augmentin) 875-125 MG per tablet; Take 1 tablet by mouth 2 (Two) Times a Day for 10 days.  Dispense: 20 tablet; Refill: 0            BMI is >= 25 and <30. (Overweight) The following options were offered after discussion;: exercise counseling/recommendations and nutrition counseling/recommendations       Follow Up   Return in about 6 months (around 7/5/2023), or if symptoms worsen or fail to improve.  Patient was given instructions and counseling regarding her condition or for health maintenance advice. Please see specific  information pulled into the AVS if appropriate.     RYLEY Long

## 2023-01-05 NOTE — ASSESSMENT & PLAN NOTE
Meds refilled.  Labs drawn.  Micro completed.  Check feet daily.  Annual eye exams encouraged.  Proper diet and exercise plan discussed and encouraged.  Risk of meds discussed understood.  Return to clinic or ED with any issues or concerns.

## 2023-01-06 LAB
ALBUMIN SERPL-MCNC: 4.1 G/DL (ref 3.8–4.8)
ALBUMIN/GLOB SERPL: 1.6 {RATIO} (ref 1.2–2.2)
ALP SERPL-CCNC: 101 IU/L (ref 44–121)
ALT SERPL-CCNC: 11 IU/L (ref 0–32)
AST SERPL-CCNC: 17 IU/L (ref 0–40)
BASOPHILS # BLD AUTO: 0.1 X10E3/UL (ref 0–0.2)
BASOPHILS NFR BLD AUTO: 0 %
BILIRUB SERPL-MCNC: 0.5 MG/DL (ref 0–1.2)
BUN SERPL-MCNC: 6 MG/DL (ref 8–27)
BUN/CREAT SERPL: 11 (ref 12–28)
CALCIUM SERPL-MCNC: 9.4 MG/DL (ref 8.7–10.3)
CHLORIDE SERPL-SCNC: 97 MMOL/L (ref 96–106)
CHOLEST SERPL-MCNC: 123 MG/DL (ref 100–199)
CO2 SERPL-SCNC: 22 MMOL/L (ref 20–29)
CREAT SERPL-MCNC: 0.54 MG/DL (ref 0.57–1)
EGFRCR SERPLBLD CKD-EPI 2021: 104 ML/MIN/1.73
EOSINOPHIL # BLD AUTO: 0.1 X10E3/UL (ref 0–0.4)
EOSINOPHIL NFR BLD AUTO: 0 %
ERYTHROCYTE [DISTWIDTH] IN BLOOD BY AUTOMATED COUNT: 11.8 % (ref 11.7–15.4)
GLOBULIN SER CALC-MCNC: 2.5 G/DL (ref 1.5–4.5)
GLUCOSE SERPL-MCNC: 86 MG/DL (ref 70–99)
HBA1C MFR BLD: 8.1 % (ref 4.8–5.6)
HCT VFR BLD AUTO: 41.7 % (ref 34–46.6)
HDLC SERPL-MCNC: 48 MG/DL
HGB BLD-MCNC: 14.2 G/DL (ref 11.1–15.9)
IMM GRANULOCYTES # BLD AUTO: 0.3 X10E3/UL (ref 0–0.1)
IMM GRANULOCYTES NFR BLD AUTO: 1 %
LDLC SERPL CALC-MCNC: 60 MG/DL (ref 0–99)
LYMPHOCYTES # BLD AUTO: 2.6 X10E3/UL (ref 0.7–3.1)
LYMPHOCYTES NFR BLD AUTO: 9 %
MCH RBC QN AUTO: 31.4 PG (ref 26.6–33)
MCHC RBC AUTO-ENTMCNC: 34.1 G/DL (ref 31.5–35.7)
MCV RBC AUTO: 92 FL (ref 79–97)
MONOCYTES # BLD AUTO: 1.8 X10E3/UL (ref 0.1–0.9)
MONOCYTES NFR BLD AUTO: 6 %
NEUTROPHILS # BLD AUTO: 23.8 X10E3/UL (ref 1.4–7)
NEUTROPHILS NFR BLD AUTO: 84 %
PLATELET # BLD AUTO: 431 X10E3/UL (ref 150–450)
POTASSIUM SERPL-SCNC: 4.8 MMOL/L (ref 3.5–5.2)
PROT SERPL-MCNC: 6.6 G/DL (ref 6–8.5)
RBC # BLD AUTO: 4.52 X10E6/UL (ref 3.77–5.28)
SODIUM SERPL-SCNC: 136 MMOL/L (ref 134–144)
TRIGL SERPL-MCNC: 71 MG/DL (ref 0–149)
TSH SERPL DL<=0.005 MIU/L-ACNC: 1 UIU/ML (ref 0.45–4.5)
VLDLC SERPL CALC-MCNC: 15 MG/DL (ref 5–40)
WBC # BLD AUTO: 28.6 X10E3/UL (ref 3.4–10.8)

## 2023-01-09 ENCOUNTER — PATIENT MESSAGE (OUTPATIENT)
Dept: FAMILY MEDICINE CLINIC | Facility: CLINIC | Age: 63
End: 2023-01-09

## 2023-01-09 LAB
BACTERIA UR CULT: ABNORMAL
BACTERIA UR CULT: ABNORMAL
OTHER ANTIBIOTIC SUSC ISLT: ABNORMAL

## 2023-01-09 RX ORDER — BENZONATATE 100 MG/1
100 CAPSULE ORAL 3 TIMES DAILY PRN
Qty: 30 CAPSULE | Refills: 0 | Status: SHIPPED | OUTPATIENT
Start: 2023-01-09

## 2023-02-03 DIAGNOSIS — I10 BENIGN ESSENTIAL HTN: ICD-10-CM

## 2023-02-03 RX ORDER — METOPROLOL SUCCINATE 25 MG/1
TABLET, EXTENDED RELEASE ORAL
Qty: 90 TABLET | Refills: 1 | OUTPATIENT
Start: 2023-02-03

## 2023-02-07 DIAGNOSIS — E11.65 TYPE 2 DIABETES MELLITUS WITH HYPERGLYCEMIA, WITH LONG-TERM CURRENT USE OF INSULIN: ICD-10-CM

## 2023-02-07 DIAGNOSIS — Z79.4 TYPE 2 DIABETES MELLITUS WITH HYPERGLYCEMIA, WITH LONG-TERM CURRENT USE OF INSULIN: ICD-10-CM

## 2023-02-07 RX ORDER — PROCHLORPERAZINE 25 MG/1
SUPPOSITORY RECTAL
Qty: 3 EACH | Refills: 1 | Status: SHIPPED | OUTPATIENT
Start: 2023-02-07

## 2023-02-07 RX ORDER — PROCHLORPERAZINE 25 MG/1
1 SUPPOSITORY RECTAL DAILY
Qty: 1 EACH | Refills: 1 | Status: SHIPPED | OUTPATIENT
Start: 2023-02-07

## 2023-03-12 ENCOUNTER — PATIENT MESSAGE (OUTPATIENT)
Dept: FAMILY MEDICINE CLINIC | Facility: CLINIC | Age: 63
End: 2023-03-12

## 2023-03-13 NOTE — TELEPHONE ENCOUNTER
Doug Scott.  New insurance is asking for all scripts to be for 90 days.  Would you call in   Lantus   Metformin   Losartan   Atorvastatin   Metoprolol   Ambien   Wixela     I’ve been on Tradjenta, but the cost is the same, so I’d like to go back to Jardiance or Januvia if that’s ok with you?      I’ve been putting it off, but know I need consults with Cardiology and Pulmonology.  If possible - on a Friday or Monday.  How is the best way to get those appointments made?  Let me know what I need to do.  Thanks!  Chloe.          Pt will need an appt right??

## 2023-03-14 ENCOUNTER — TELEPHONE (OUTPATIENT)
Dept: FAMILY MEDICINE CLINIC | Facility: CLINIC | Age: 63
End: 2023-03-14
Payer: COMMERCIAL

## 2023-03-14 NOTE — TELEPHONE ENCOUNTER
Please let patient know that in order to do pulmonary and cardiology referrals we would need an appointment so that I can have a note typed up to be sent with the referral (she [place] state to do telehealth).    It looks like I have already sent 90-day fills of her medications.  The Ambien though with it being a controlled substance refill 1 month at a time as she needs to follow-up every 3 months with it being controlled.  Thanks

## 2023-03-14 NOTE — TELEPHONE ENCOUNTER
----- Message from Bev Carl MA sent at 3/13/2023  2:30 PM EDT -----  Regarding: FW: New insurance   Contact: [phone]        ----- Message -----  From: Stephani Ye  Sent: 3/12/2023   9:21 PM EDT  To: Aparna Rosenberg [facility]  Subject: New insurance                                    Hi Tyler.  New insurance is asking for all scripts to be for 90 days.  Would you call in   Lantus   Metformin   Losartan   Atorvastatin   Metoprolol   Ambien   Wixela    I’ve been on Tradjenta, but the cost is the same, so I’d like to go back to Jardiance or Januvia if that’s ok with you?     I’ve been putting it off, but know I need consults with Cardiology and Pulmonology.  If possible - on a Friday or Monday.  How is the best way to get those appointments made?  Let me know what I need to do.  Thanks!  Leonie

## 2023-03-27 RX ORDER — PROCHLORPERAZINE 25 MG/1
SUPPOSITORY RECTAL
Qty: 3 EACH | Refills: 1 | Status: SHIPPED | OUTPATIENT
Start: 2023-03-27

## 2023-04-13 DIAGNOSIS — E11.65 TYPE 2 DIABETES MELLITUS WITH HYPERGLYCEMIA, WITH LONG-TERM CURRENT USE OF INSULIN: ICD-10-CM

## 2023-04-13 DIAGNOSIS — Z79.4 TYPE 2 DIABETES MELLITUS WITH HYPERGLYCEMIA, WITH LONG-TERM CURRENT USE OF INSULIN: ICD-10-CM

## 2023-04-14 RX ORDER — INSULIN GLARGINE 100 [IU]/ML
INJECTION, SOLUTION SUBCUTANEOUS
Qty: 15 ML | Refills: 2 | Status: SHIPPED | OUTPATIENT
Start: 2023-04-14

## 2023-04-14 RX ORDER — INSULIN GLARGINE 100 [IU]/ML
INJECTION, SOLUTION SUBCUTANEOUS
OUTPATIENT
Start: 2023-04-14

## 2023-06-07 ENCOUNTER — OFFICE VISIT (OUTPATIENT)
Dept: FAMILY MEDICINE CLINIC | Facility: CLINIC | Age: 63
End: 2023-06-07
Payer: COMMERCIAL

## 2023-06-07 VITALS
WEIGHT: 157.19 LBS | HEART RATE: 83 BPM | SYSTOLIC BLOOD PRESSURE: 122 MMHG | BODY MASS INDEX: 26.84 KG/M2 | HEIGHT: 64 IN | OXYGEN SATURATION: 97 % | DIASTOLIC BLOOD PRESSURE: 86 MMHG

## 2023-06-07 DIAGNOSIS — J44.9 CHRONIC OBSTRUCTIVE PULMONARY DISEASE, UNSPECIFIED COPD TYPE: ICD-10-CM

## 2023-06-07 DIAGNOSIS — I10 BENIGN ESSENTIAL HTN: Primary | ICD-10-CM

## 2023-06-07 DIAGNOSIS — Z72.0 TOBACCO USE: ICD-10-CM

## 2023-06-07 DIAGNOSIS — Z12.31 ENCOUNTER FOR SCREENING MAMMOGRAM FOR MALIGNANT NEOPLASM OF BREAST: ICD-10-CM

## 2023-06-07 DIAGNOSIS — I25.10 CORONARY ARTERY CALCIFICATION: ICD-10-CM

## 2023-06-07 DIAGNOSIS — Z12.2 SCREENING FOR LUNG CANCER: ICD-10-CM

## 2023-06-07 DIAGNOSIS — Z11.59 NEED FOR HEPATITIS C SCREENING TEST: ICD-10-CM

## 2023-06-07 DIAGNOSIS — Z79.899 ENCOUNTER FOR LONG-TERM (CURRENT) USE OF OTHER MEDICATIONS: ICD-10-CM

## 2023-06-07 DIAGNOSIS — I25.84 CORONARY ARTERY CALCIFICATION: ICD-10-CM

## 2023-06-07 DIAGNOSIS — E11.65 TYPE 2 DIABETES MELLITUS WITH HYPERGLYCEMIA, WITH LONG-TERM CURRENT USE OF INSULIN: ICD-10-CM

## 2023-06-07 DIAGNOSIS — H40.9 GLAUCOMA, UNSPECIFIED GLAUCOMA TYPE, UNSPECIFIED LATERALITY: ICD-10-CM

## 2023-06-07 DIAGNOSIS — J43.9 BULLA OF LUNG: ICD-10-CM

## 2023-06-07 DIAGNOSIS — F51.01 PRIMARY INSOMNIA: ICD-10-CM

## 2023-06-07 DIAGNOSIS — Z79.4 TYPE 2 DIABETES MELLITUS WITH HYPERGLYCEMIA, WITH LONG-TERM CURRENT USE OF INSULIN: ICD-10-CM

## 2023-06-07 DIAGNOSIS — E78.2 MIXED HYPERLIPIDEMIA: ICD-10-CM

## 2023-06-07 LAB
BILIRUB BLD-MCNC: NEGATIVE MG/DL
CLARITY, POC: CLEAR
COLOR UR: YELLOW
EXPIRATION DATE: NORMAL
GLUCOSE UR STRIP-MCNC: NEGATIVE MG/DL
KETONES UR QL: NEGATIVE
LEUKOCYTE EST, POC: NEGATIVE
Lab: NORMAL
NITRITE UR-MCNC: NEGATIVE MG/ML
PH UR: 7.5 [PH] (ref 5–8)
POC AMPHETAMINES: NEGATIVE
POC BARBITURATES: NEGATIVE
POC BENZODIAZEPHINES: NEGATIVE
POC COCAINE: NEGATIVE
POC METHADONE: NEGATIVE
POC METHAMPHETAMINE SCREEN URINE: NEGATIVE
POC OPIATES: NEGATIVE
POC OXYCODONE: NEGATIVE
POC PHENCYCLIDINE: NEGATIVE
POC PROPOXYPHENE: NEGATIVE
POC THC: NEGATIVE
POC TRICYCLIC ANTIDEPRESSANTS: NEGATIVE
PROT UR STRIP-MCNC: NEGATIVE MG/DL
RBC # UR STRIP: NEGATIVE /UL
SP GR UR: 1.01 (ref 1–1.03)
UROBILINOGEN UR QL: NORMAL

## 2023-06-07 RX ORDER — LOSARTAN POTASSIUM 100 MG/1
100 TABLET ORAL DAILY
Qty: 90 TABLET | Refills: 1 | Status: SHIPPED | OUTPATIENT
Start: 2023-06-07

## 2023-06-07 RX ORDER — TRAVOPROST OPHTHALMIC SOLUTION 0.04 MG/ML
SOLUTION OPHTHALMIC
COMMUNITY
Start: 2023-05-26

## 2023-06-07 RX ORDER — ATORVASTATIN CALCIUM 10 MG/1
10 TABLET, FILM COATED ORAL DAILY
Qty: 90 TABLET | Refills: 1 | Status: SHIPPED | OUTPATIENT
Start: 2023-06-07

## 2023-06-07 RX ORDER — INSULIN GLARGINE 100 [IU]/ML
INJECTION, SOLUTION SUBCUTANEOUS
Qty: 15 ML | Refills: 2 | Status: SHIPPED | OUTPATIENT
Start: 2023-06-07

## 2023-06-07 RX ORDER — METOPROLOL SUCCINATE 25 MG/1
25 TABLET, EXTENDED RELEASE ORAL DAILY
Qty: 90 TABLET | Refills: 1 | Status: SHIPPED | OUTPATIENT
Start: 2023-06-07

## 2023-06-07 RX ORDER — PROCHLORPERAZINE 25 MG/1
SUPPOSITORY RECTAL
Qty: 3 EACH | Refills: 3 | Status: SHIPPED | OUTPATIENT
Start: 2023-06-07

## 2023-06-07 RX ORDER — FLUTICASONE PROPIONATE AND SALMETEROL 250; 50 UG/1; UG/1
1 POWDER RESPIRATORY (INHALATION)
Qty: 1 EACH | Refills: 5 | Status: SHIPPED | OUTPATIENT
Start: 2023-06-07

## 2023-06-08 DIAGNOSIS — F51.01 PRIMARY INSOMNIA: ICD-10-CM

## 2023-06-08 PROBLEM — I25.10 CORONARY ARTERY CALCIFICATION: Status: ACTIVE | Noted: 2023-06-08

## 2023-06-08 PROBLEM — I25.84 CORONARY ARTERY CALCIFICATION: Status: ACTIVE | Noted: 2023-06-08

## 2023-06-08 PROBLEM — J43.9 BULLA OF LUNG: Status: ACTIVE | Noted: 2023-06-08

## 2023-06-08 LAB
ALBUMIN SERPL-MCNC: 4.7 G/DL (ref 3.8–4.8)
ALBUMIN/GLOB SERPL: 2.1 {RATIO} (ref 1.2–2.2)
ALP SERPL-CCNC: 112 IU/L (ref 44–121)
ALT SERPL-CCNC: 16 IU/L (ref 0–32)
AST SERPL-CCNC: 18 IU/L (ref 0–40)
BASOPHILS # BLD AUTO: 0.1 X10E3/UL (ref 0–0.2)
BASOPHILS NFR BLD AUTO: 1 %
BILIRUB SERPL-MCNC: <0.2 MG/DL (ref 0–1.2)
BUN SERPL-MCNC: 8 MG/DL (ref 8–27)
BUN/CREAT SERPL: 12 (ref 12–28)
CALCIUM SERPL-MCNC: 9.8 MG/DL (ref 8.7–10.3)
CHLORIDE SERPL-SCNC: 101 MMOL/L (ref 96–106)
CO2 SERPL-SCNC: 22 MMOL/L (ref 20–29)
CREAT SERPL-MCNC: 0.67 MG/DL (ref 0.57–1)
EGFRCR SERPLBLD CKD-EPI 2021: 98 ML/MIN/1.73
EOSINOPHIL # BLD AUTO: 0.1 X10E3/UL (ref 0–0.4)
EOSINOPHIL NFR BLD AUTO: 1 %
ERYTHROCYTE [DISTWIDTH] IN BLOOD BY AUTOMATED COUNT: 12.2 % (ref 11.7–15.4)
GLOBULIN SER CALC-MCNC: 2.2 G/DL (ref 1.5–4.5)
GLUCOSE SERPL-MCNC: 124 MG/DL (ref 70–99)
HBA1C MFR BLD: 7.2 % (ref 4.8–5.6)
HCT VFR BLD AUTO: 40.2 % (ref 34–46.6)
HCV IGG SERPL QL IA: NON REACTIVE
HGB BLD-MCNC: 13.7 G/DL (ref 11.1–15.9)
IMM GRANULOCYTES # BLD AUTO: 0.1 X10E3/UL (ref 0–0.1)
IMM GRANULOCYTES NFR BLD AUTO: 1 %
LYMPHOCYTES # BLD AUTO: 2.3 X10E3/UL (ref 0.7–3.1)
LYMPHOCYTES NFR BLD AUTO: 22 %
MCH RBC QN AUTO: 31.4 PG (ref 26.6–33)
MCHC RBC AUTO-ENTMCNC: 34.1 G/DL (ref 31.5–35.7)
MCV RBC AUTO: 92 FL (ref 79–97)
MONOCYTES # BLD AUTO: 0.6 X10E3/UL (ref 0.1–0.9)
MONOCYTES NFR BLD AUTO: 6 %
NEUTROPHILS # BLD AUTO: 7.3 X10E3/UL (ref 1.4–7)
NEUTROPHILS NFR BLD AUTO: 69 %
PLATELET # BLD AUTO: 329 X10E3/UL (ref 150–450)
POTASSIUM SERPL-SCNC: 5.4 MMOL/L (ref 3.5–5.2)
PROT SERPL-MCNC: 6.9 G/DL (ref 6–8.5)
RBC # BLD AUTO: 4.36 X10E6/UL (ref 3.77–5.28)
SODIUM SERPL-SCNC: 139 MMOL/L (ref 134–144)
WBC # BLD AUTO: 10.4 X10E3/UL (ref 3.4–10.8)

## 2023-06-08 RX ORDER — ZOLPIDEM TARTRATE 10 MG/1
10 TABLET ORAL
Qty: 30 TABLET | Refills: 2 | Status: SHIPPED | OUTPATIENT
Start: 2023-06-08

## 2023-06-08 NOTE — ASSESSMENT & PLAN NOTE
Continue current meds.  Labs drawn.  Proper diet and exercise plan discussed and encouraged.  Check feet daily.  Annual eye exams encouraged.  Risk of meds discussed and understood.  Return to clinic or ED with any issues or concerns.

## 2023-06-08 NOTE — ASSESSMENT & PLAN NOTE
Continue current meds.  Labs drawn.  Proper diet and exercise plan discussed and encouraged.  Smoking cessation discussed and encouraged.  Risk of meds discussed and understood.  Return to clinic or ED with any issues or concerns.

## 2023-06-08 NOTE — ASSESSMENT & PLAN NOTE
TUAN appropriate.  Cruz appropriate. Will ask Dr. Garcia to send refills.  Risk of meds discussed understood.  Education provided.  Return to clinic or ED with any issues or concerns.

## 2023-06-08 NOTE — TELEPHONE ENCOUNTER
Ofe Garcia, care to send in this patients ambien. I saw her yesterday. TUAN and STEPHANIE appropriate. Thanks

## 2023-06-08 NOTE — PROGRESS NOTES
"Chief Complaint  3 mnth follow up    Subjective          Stephani Ye presents to [facility] PRIMARY [facility]ARE  History of Present Illness    Presents for refills.  History of COPD hypertension hyperlipidemia type 2 diabetes glaucoma and insomnia.  Doing well on current medications with no issues or side effects.  No dizziness no headache no chest pain no chest pressure no shortness of breath no trouble breathing no urinary or bowel issues.  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  [facility] may be getting a contract in [place] in the next couple of weeks.    She does have a history of type 2 diabetes and is on metformin Tradjenta and 60 units of Lantus.  She does have Dexcom and states blood sugars have been doing well.  States anywhere 100-1 50s.  States it are fine with no cuts or sores.  States she stays up-to-date with eye exams.    She continues to smoke and does agree to having a low-dose lung CT scan.  She is also due a mammogram.  She denies Pap smears stating she has had a total hysterectomy.  She denies all immunizations including pneumonia tetanus and shingles.    Reviewing records patient had a CT skin of her thorax in December 2021 that showed emphysema with large bulla formation in the medial anterior right lung as well as bronchial wall thickening consistent with a component of bronchitis as well.  We had discussed this in the past and at that time she had denied a pulmonary referral but she is now agreeable to that so I will place that referral.    That same CT scan also noted coronary artery calcifications and other vascular calcifications but again she denied cardiology referral at the time and would like to have that placed now.  No chest pain no chest pressure no shortness of breath no trouble breathing.    Objective   Vital Signs:   /86   Pulse 83   Ht 162.6 cm (64\")   Wt 71.3 kg (157 lb 3 oz)   SpO2 97%   BMI 26.98 kg/m²     Body mass index is 26.98 " kg/m².    Review of Systems   Constitutional:  Negative for chills, fatigue and fever.   HENT:  Negative for congestion.    Eyes:  Negative for visual disturbance.   Respiratory:  Negative for cough, shortness of breath and wheezing.    Cardiovascular: Negative.    Gastrointestinal:  Negative for abdominal pain, constipation, diarrhea, nausea and vomiting.   Genitourinary:  Negative for decreased urine volume, dysuria, frequency, hematuria and urgency.   Musculoskeletal:  Negative for back pain.   Neurological: Negative.    Psychiatric/Behavioral: Negative.       Past History:  Medical History: has a past medical history of Carpal tunnel syndrome (), Elevated blood pressure reading, Emphysema, unspecified, Glaucoma, and Pregnancy.   Surgical History: has a past surgical history that includes Carpal tunnel release (); Tubal ligation; Tonsillectomy; Hysterectomy; Cosmetic surgery;  section; and Breast surgery.   Family History: family history includes Cancer in an other family member; Coronary artery disease in her mother and another family member; Depression in her sister and another family member; Diabetes in her brother; Hypertension in her brother and mother; Osteoarthritis in her mother; Peripheral vascular disease in her mother.   Social History: reports that she has been smoking cigarettes. She has a 30.00 pack-year smoking history. She has never used smokeless tobacco. She reports current alcohol use. Drug use questions deferred to the physician.    PHQ-2 Depression Screening  Little interest or pleasure in doing things?     Feeling down, depressed, or hopeless?     PHQ-2 Total Score          PHQ-9 Depression Screening  Little interest or pleasure in doing things?     Feeling down, depressed, or hopeless?     Trouble falling or staying asleep, or sleeping too much?     Feeling tired or having little energy?     Poor appetite or overeating?     Feeling bad about yourself - or that you are a  failure or have let yourself or your family down?     Trouble concentrating on things, such as reading the newspaper or watching television?     Moving or speaking so slowly that other people could have noticed? Or the opposite - being so fidgety or restless that you have been moving around a lot more than usual?     Thoughts that you would be better off dead, or of hurting yourself in some way?     PHQ-9 Total Score     If you checked off any problems, how difficult have these problems made it for you to do your work, take care of things at home, or get along with other people?       PHQ-9 Total Score:        Patient screened positive for depression based on a PHQ-9 score of 0 on 1/5/2023. Follow-up recommendations include:          Current Outpatient Medications:     albuterol sulfate  (90 Base) MCG/ACT inhaler, Inhale 2 puffs Every 4 (Four) Hours As Needed for Wheezing., Disp: 18 g, Rfl: 0    atorvastatin (LIPITOR) 10 MG tablet, Take 1 tablet by mouth Daily., Disp: 90 tablet, Rfl: 1    Continuous Blood Gluc Sensor (Dexcom G6 Sensor), CHANGE SENSOR EVERY 10 DAYS, Disp: 3 each, Rfl: 1    Continuous Blood Gluc Sensor (Dexcom G6 Sensor), Check glucose 3-4 times daily and PRN; E11.9, Disp: 3 each, Rfl: 3    Continuous Blood Gluc Transmit (Dexcom G6 Transmitter) misc, 1 each by Other route Daily., Disp: 1 each, Rfl: 1    dorzolamide-timolol (COSOPT) 22.3-6.8 MG/ML ophthalmic solution, Apply  to eye(s) as directed by provider Every 12 (Twelve) Hours., Disp: , Rfl:     Fluticasone-Salmeterol (Wixela Inhub) 250-50 MCG/ACT DISKUS, Inhale 1 puff 2 (Two) Times a Day., Disp: 1 each, Rfl: 5    Insulin Glargine (Lantus SoloStar) 100 UNIT/ML injection pen, Inject 60 units daily subcutaneously as per insulin protocol every morning, Disp: 15 mL, Rfl: 2    linagliptin (Tradjenta) 5 MG tablet tablet, Take 1 tablet by mouth Daily., Disp: 90 tablet, Rfl: 1    losartan (COZAAR) 100 MG tablet, Take 1 tablet by mouth Daily., Disp:  90 tablet, Rfl: 1    metFORMIN (GLUCOPHAGE) 1000 MG tablet, Take 1 tablet by mouth 2 (Two) Times a Day With Meals., Disp: 180 tablet, Rfl: 1    metoprolol succinate XL (TOPROL-XL) 25 MG 24 hr tablet, Take 1 tablet by mouth Daily., Disp: 90 tablet, Rfl: 1    travoprost, UZMA free, (TRAVATAN) 0.004 % solution ophthalmic solution, PLACE ONE DROP INTO BOTH EYES AS DIRECTED AT BEDTIME, Disp: , Rfl:     zolpidem (AMBIEN) 10 MG tablet, Take 1 tablet by mouth every night at bedtime., Disp: 30 tablet, Rfl: 2   (Not in a hospital admission)     Allergies: Patient has no known allergies.    Physical Exam  Constitutional:       Appearance: Normal appearance.   Eyes:      Conjunctiva/sclera: Conjunctivae normal.      Pupils: Pupils are equal, round, and reactive to light.   Cardiovascular:      Rate and Rhythm: Normal rate and regular rhythm.      Heart sounds: Normal heart sounds.   Pulmonary:      Effort: Pulmonary effort is normal.      Breath sounds: Normal breath sounds.   Neurological:      General: No focal deficit present.      Mental Status: She is alert and oriented to person, place, and time. Mental status is at baseline.   Psychiatric:         Attention and Perception: Attention and perception normal.         Mood and Affect: Mood and affect normal.         Speech: Speech normal.         Behavior: Behavior normal. Behavior is cooperative.         Thought Content: Thought content normal. Thought content does not include homicidal or suicidal ideation. Thought content does not include homicidal or suicidal plan.         Cognition and Memory: Cognition and memory normal.         Judgment: Judgment normal.        Result Review :                   Assessment and Plan    Diagnoses and all orders for this visit:    1. Benign essential HTN (Primary)  Assessment & Plan:  Continue current meds.  Labs drawn.  Goal blood pressure less than 140/90.  Let me know if gets to or above that.  Proper diet and exercise plan discussed and  encouraged.  Risk of meds discussed and understood.  Return to clinic or ED with any issues or concerns.    Orders:  -     POC Urinalysis Dipstick, Automated; Future  -     POC Urinalysis Dipstick, Automated  -     losartan (COZAAR) 100 MG tablet; Take 1 tablet by mouth Daily.  Dispense: 90 tablet; Refill: 1  -     metoprolol succinate XL (TOPROL-XL) 25 MG 24 hr tablet; Take 1 tablet by mouth Daily.  Dispense: 90 tablet; Refill: 1  -     Ambulatory Referral to Cardiology    2. Mixed hyperlipidemia  Assessment & Plan:  Continue current meds.  Labs drawn.  Proper diet and exercise plan discussed and encouraged.  Risk of meds discussed and understood.  Return to clinic or ED with any issues or concerns.    Orders:  -     atorvastatin (LIPITOR) 10 MG tablet; Take 1 tablet by mouth Daily.  Dispense: 90 tablet; Refill: 1    3. Type 2 diabetes mellitus with hyperglycemia, with long-term current use of insulin  Assessment & Plan:  Continue current meds.  Labs drawn.  Proper diet and exercise plan discussed and encouraged.  Check feet daily.  Annual eye exams encouraged.  Risk of meds discussed and understood.  Return to clinic or ED with any issues or concerns.    Orders:  -     Hemoglobin A1c  -     Insulin Glargine (Lantus SoloStar) 100 UNIT/ML injection pen; Inject 60 units daily subcutaneously as per insulin protocol every morning  Dispense: 15 mL; Refill: 2  -     linagliptin (Tradjenta) 5 MG tablet tablet; Take 1 tablet by mouth Daily.  Dispense: 90 tablet; Refill: 1  -     metFORMIN (GLUCOPHAGE) 1000 MG tablet; Take 1 tablet by mouth 2 (Two) Times a Day With Meals.  Dispense: 180 tablet; Refill: 1    4. Glaucoma, unspecified glaucoma type, unspecified laterality  Assessment & Plan:  Continue to follow-up with eye specialist as scheduled.      5. Encounter for long-term (current) use of other medications  -     POC Urine Drug Screen, Triage  -     CBC & Differential  -     Comprehensive Metabolic Panel    6. Chronic  obstructive pulmonary disease, unspecified COPD type  Assessment & Plan:  Continue current meds.  Labs drawn.  Proper diet and exercise plan discussed and encouraged.  Smoking cessation discussed and encouraged.  Risk of meds discussed and understood.  Return to clinic or ED with any issues or concerns.    Orders:  -     Fluticasone-Salmeterol (Wixela Inhub) 250-50 MCG/ACT DISKUS; Inhale 1 puff 2 (Two) Times a Day.  Dispense: 1 each; Refill: 5  -     Ambulatory Referral to Pulmonology    7. Primary insomnia  Assessment & Plan:  UDS appropriate.  Cruz appropriate. Will ask Dr. Garcia to send refills.  Risk of meds discussed understood.  Education provided.  Return to clinic or ED with any issues or concerns.      8. Tobacco use    9. Screening for lung cancer  -     CT Chest Low Dose Wo; Future    10. Need for hepatitis C screening test  -     Hepatitis C antibody    11. Encounter for screening mammogram for malignant neoplasm of breast  -     Mammo Screening Bilateral With CAD; Future    12. Bulla of lung  Assessment & Plan:  Will refer to pulmonary for further evaluation.    Orders:  -     Ambulatory Referral to Pulmonology    13. Coronary artery calcification  Assessment & Plan:  Will refer to cardiology for cardiac evaluation.    Orders:  -     Ambulatory Referral to Cardiology    Other orders  -     Continuous Blood Gluc Sensor (Dexcom G6 Sensor); Check glucose 3-4 times daily and PRN; E11.9  Dispense: 3 each; Refill: 3                        Follow Up   Return in about 3 months (around 9/7/2023).  Patient was given instructions and counseling regarding her condition or for health maintenance advice. Please see specific information pulled into the AVS if appropriate.     RYLEY Long

## 2023-06-08 NOTE — ASSESSMENT & PLAN NOTE
Continue current meds.  Labs drawn.  Proper diet and exercise plan discussed and encouraged.  Risk of meds discussed and understood.  Return to clinic or ED with any issues or concerns.

## 2023-06-08 NOTE — ASSESSMENT & PLAN NOTE
Continue current meds.  Labs drawn.  Goal blood pressure less than 140/90.  Let me know if gets to or above that.  Proper diet and exercise plan discussed and encouraged.  Risk of meds discussed and understood.  Return to clinic or ED with any issues or concerns.

## 2023-06-14 ENCOUNTER — OFFICE VISIT (OUTPATIENT)
Dept: CARDIOLOGY | Facility: CLINIC | Age: 63
End: 2023-06-14
Payer: COMMERCIAL

## 2023-06-14 VITALS
WEIGHT: 154 LBS | RESPIRATION RATE: 18 BRPM | BODY MASS INDEX: 26.29 KG/M2 | SYSTOLIC BLOOD PRESSURE: 138 MMHG | HEIGHT: 64 IN | HEART RATE: 88 BPM | OXYGEN SATURATION: 94 % | DIASTOLIC BLOOD PRESSURE: 78 MMHG

## 2023-06-14 DIAGNOSIS — I25.10 CORONARY ARTERY DISEASE INVOLVING NATIVE CORONARY ARTERY OF NATIVE HEART WITHOUT ANGINA PECTORIS: Primary | ICD-10-CM

## 2023-06-14 DIAGNOSIS — J44.9 CHRONIC OBSTRUCTIVE PULMONARY DISEASE, UNSPECIFIED COPD TYPE: ICD-10-CM

## 2023-06-14 DIAGNOSIS — I10 BENIGN ESSENTIAL HTN: ICD-10-CM

## 2023-06-14 DIAGNOSIS — E78.00 HYPERCHOLESTEROLEMIA: ICD-10-CM

## 2023-06-14 PROBLEM — E78.2 MIXED HYPERLIPIDEMIA: Status: RESOLVED | Noted: 2022-10-14 | Resolved: 2023-06-14

## 2023-06-14 RX ORDER — AMLODIPINE BESYLATE 5 MG/1
5 TABLET ORAL DAILY
Qty: 90 TABLET | Refills: 3 | Status: SHIPPED | OUTPATIENT
Start: 2023-06-14

## 2023-06-14 RX ORDER — METOPROLOL SUCCINATE 50 MG/1
50 TABLET, EXTENDED RELEASE ORAL DAILY
Qty: 90 TABLET | Refills: 2 | Status: SHIPPED | OUTPATIENT
Start: 2023-06-14

## 2023-06-14 RX ORDER — ASPIRIN 81 MG/1
81 TABLET ORAL DAILY
Qty: 90 TABLET | Refills: 3 | Status: SHIPPED | OUTPATIENT
Start: 2023-06-14

## 2023-06-14 NOTE — PROGRESS NOTES
[facility]  [facility]  [address] [place] [address]  Dept: [phone]  Dept Fax: [phone]    Stephani Ye  1960    New Patient Office Note    History of Present Illness:  Stephani Ye is a 63 y.o. female who presents to the clinic for Establish Care. Shortness of breath and calcifications of the coronary arteries by Ct chest, She is 63 still smoking 1 pack a day for 40 years.hypertension, hyperlipidemia,  on Toprol xl 25 mg, and Losartan 100 mg,  plus lipitor 10 mg, denies any chest pain, but has SOB on exertion, climbing stairs walking few block, denies any claudication, her BP is 160.80, her BP at home alare also high 140 150 per patient, her cardiac exam seems normal EKG sinus Hr 80 and Non specific st t abnormalities  and lVH, likely from Hypertension, but we can not excluded ischemia,  , will increase Toprol xl to 50 mg and add Amlodipine 5 mg, She has been dx with COPD and is using inhalers., will get a treadmill Myoview and also an echo, she states she is likely to be able to walk on the treadmill despite of the SOB and COPD    The following portions of the patient's history were reviewed and updated as appropriate: allergies, current medications, past family history, past medical history, past social history, past surgical history, and problem list.    Medications:  albuterol sulfate HFA  amLODIPine  aspirin  atorvastatin  Dexcom G6 Sensor  Dexcom G6 Transmitter misc  dorzolamide-timolol  Fluticasone-Salmeterol  Lantus SoloStar solution pen-injector  linagliptin tablet  losartan  metFORMIN  metoprolol succinate XL  travoprost (UZMA free) solution  zolpidem    Subjective  No Known Allergies     Past Medical History:   Diagnosis Date   • Carpal tunnel syndrome 1995   • Elevated blood pressure reading    • Emphysema, unspecified    • Glaucoma    • Pregnancy     1981,1983,1986       Past Surgical History:   Procedure Laterality Date   • BREAST SURGERY     •  "CARPAL TUNNEL RELEASE     •  SECTION     • COSMETIC SURGERY     • HYSTERECTOMY     • TONSILLECTOMY     • TUBAL ABDOMINAL LIGATION         Family History   Problem Relation Age of Onset   • Hypertension Mother    • Osteoarthritis Mother    • Coronary artery disease Mother    • Peripheral vascular disease Mother    • Depression Sister    • Diabetes Brother    • Hypertension Brother    • Coronary artery disease Other         PREMATURE   • Depression Other    • Cancer Other         Social History     Socioeconomic History   • Marital status: Single   Tobacco Use   • Smoking status: Heavy Smoker     Packs/day: 1.00     Years: 30.00     Pack years: 30.00     Types: Cigarettes   • Smokeless tobacco: Never   Vaping Use   • Vaping Use: Never used   Substance and Sexual Activity   • Alcohol use: Yes     Comment: Rarely   • Drug use: Defer   • Sexual activity: Defer       Review of Systems   Constitutional: Negative.    HENT: Negative.     Respiratory:  Positive for shortness of breath.    Cardiovascular: Negative.    Endocrine: Negative.    Genitourinary: Negative.    Musculoskeletal: Negative.    Skin: Negative.    Allergic/Immunologic: Negative.    Neurological: Negative.    Hematological: Negative.    Psychiatric/Behavioral: Negative.       Cardiovascular Procedures    ECHO/MUGA:  STRESS TESTS:   CARDIAC CATH:   DEVICES:   HOLTER:   CT/MRI:   VASCULAR:   CARDIOTHORACIC:     Objective  Vitals:    23 1048   BP: 138/78   BP Location: Left arm   Patient Position: Sitting   Cuff Size: Adult   Pulse: 88   Resp: 18   SpO2: 94%   Weight: 69.9 kg (154 lb)   Height: 162.6 cm (64\")   PainSc: 0-No pain       Physical Exam  Constitutional:       Appearance: Healthy appearance. Not in distress.   Neck:      Vascular: No JVR. JVD normal.   Pulmonary:      Effort: Pulmonary effort is normal.      Breath sounds: Normal breath sounds. No wheezing. No rhonchi. No rales.   Chest:      Chest wall: Not tender to palpatation. "   Cardiovascular:      PMI at left midclavicular line. Normal rate. Regular rhythm. Normal S1. Normal S2.       Murmurs: There is no murmur.      No gallop.  No click. No rub.   Pulses:     Intact distal pulses.   Edema:     Peripheral edema absent.   Abdominal:      General: Bowel sounds are normal.      Palpations: Abdomen is soft.      Tenderness: There is no abdominal tenderness.   Musculoskeletal: Normal range of motion.         General: No tenderness. Skin:     General: Skin is warm and dry.   Neurological:      General: No focal deficit present.      Mental Status: Alert and oriented to person, place and time.        Diagnostic Data    ECG 12 Lead    Date/Time: 2023 12:06 PM  Performed by: Hiro Mix MD  Authorized by: Hiro Mix MD   Comparison: compared with previous ECG from 3/3/2003  Rhythm: sinus rhythm  Rate: normal  BPM: 80  QRS axis: normal  Other findings: non-specific ST-T wave changes and left ventricular hypertrophy with strain    Clinical impression: abnormal EKG      Assessment and Plan  Diagnoses and all orders for this visit:    Coronary artery disease involving native coronary artery of native heart without angina pectoris-She has calcifications of the coronary arteries by CT , still smoking , no chest pain, but SOB, her son has  at age 31 suddenly apparently has left main disease , will keep ASA and will get a stress nuclear plus echo  -     Stress Test With Myocardial Perfusion One Day; Future  -     Adult Transthoracic Echo Complete W/ Cont if Necessary Per Protocol; Future    Benign essential HTN- Bp is 160.80, will increase Toprol xl to 50 mg, keep Losartan 100 mg and  add Amlodipine 5 mg  -     metoprolol succinate XL (TOPROL-XL) 50 MG 24 hr tablet; Take 1 tablet by mouth Daily.  -     Stress Test With Myocardial Perfusion One Day; Future    Chronic obstructive pulmonary disease, unspecified COPD type- Per PCP    Hypercholesterolemia- on Lipitor 10 mg,  tolerates well and Ldl is great 60, will keep same meds    Other orders  -     aspirin 81 MG EC tablet; Take 1 tablet by mouth Daily.  -     amLODIPine (NORVASC) 5 MG tablet; Take 1 tablet by mouth Daily.        Return in about 3 weeks (around 7/5/2023) for Recheck with Dr. Mix.    Hiro Mix MD  06/14/2023

## 2023-06-15 ENCOUNTER — TELEPHONE (OUTPATIENT)
Dept: FAMILY MEDICINE CLINIC | Facility: CLINIC | Age: 63
End: 2023-06-15
Payer: COMMERCIAL

## 2023-06-15 NOTE — TELEPHONE ENCOUNTER
Caller: Stephani Ye    Relationship to patient: Self    Best call back number: [phone]    Patient is needing: PATIENT STATES SHE WAS GIVEN A REFERRAL TO PULMONOLOGY AND SHE HAS NOT HEARD ANYTHING BACK ABOUT THE REFERRAL.     PLEASE CALL BACK WITH SCHEDULING, IF NO ANSWER LEAVE A MESSAGE.

## 2023-06-15 NOTE — TELEPHONE ENCOUNTER
HUB TO READ    I left detailed msg for patient but if she calls back:    Referral was just faxed to Dr Mccray's office in [place] on 6/13. They will review and then contact pt to schedule. PH#[phone] if she doesn't hear anything in about a week.

## 2023-06-15 NOTE — TELEPHONE ENCOUNTER
Do we know anything about these? Looks like they have been ordered but can't tell anything else?       PATIENT STATES SHE WAS SUPPOSED TO HAVE ORDERS PUT IN FOR A CHEST CT, SCREENING CT, AND A MAMMOGRAM AND SHE HAS NOT GOTTEN A CALL FOR SCHEDULING YET.

## 2023-06-15 NOTE — TELEPHONE ENCOUNTER
Caller: Stephani Ye    Relationship to patient: Self    Best call back number: [phone]    Patient is needing: PATIENT STATES SHE WAS SUPPOSED TO HAVE ORDERS PUT IN FOR A CHEST CT, SCREENING CT, AND A MAMMOGRAM AND SHE HAS NOT GOTTEN A CALL FOR SCHEDULING YET.     PATIENT IS REQUESTING A CALL BACK TO SCHEDULE, IF NO ANSWER LEAVE  A MESSAGE.

## 2023-06-30 PROBLEM — R06.02 SOB (SHORTNESS OF BREATH): Status: ACTIVE | Noted: 2023-06-30

## 2023-07-04 ENCOUNTER — PATIENT MESSAGE (OUTPATIENT)
Dept: FAMILY MEDICINE CLINIC | Facility: CLINIC | Age: 63
End: 2023-07-04

## 2023-07-29 DIAGNOSIS — Z79.4 TYPE 2 DIABETES MELLITUS WITH HYPERGLYCEMIA, WITH LONG-TERM CURRENT USE OF INSULIN: ICD-10-CM

## 2023-07-29 DIAGNOSIS — E11.65 TYPE 2 DIABETES MELLITUS WITH HYPERGLYCEMIA, WITH LONG-TERM CURRENT USE OF INSULIN: ICD-10-CM

## 2023-07-31 ENCOUNTER — TELEPHONE (OUTPATIENT)
Dept: FAMILY MEDICINE CLINIC | Facility: CLINIC | Age: 63
End: 2023-07-31
Payer: COMMERCIAL

## 2023-07-31 RX ORDER — INSULIN GLARGINE 100 [IU]/ML
INJECTION, SOLUTION SUBCUTANEOUS
Qty: 90 ML | Refills: 1 | Status: SHIPPED | OUTPATIENT
Start: 2023-07-31

## 2023-08-25 ENCOUNTER — OFFICE VISIT (OUTPATIENT)
Dept: FAMILY MEDICINE CLINIC | Facility: CLINIC | Age: 63
End: 2023-08-25
Payer: COMMERCIAL

## 2023-08-25 VITALS
WEIGHT: 142 LBS | HEIGHT: 64 IN | SYSTOLIC BLOOD PRESSURE: 128 MMHG | HEART RATE: 85 BPM | OXYGEN SATURATION: 93 % | DIASTOLIC BLOOD PRESSURE: 76 MMHG | BODY MASS INDEX: 24.24 KG/M2

## 2023-08-25 DIAGNOSIS — J44.1 COPD WITH EXACERBATION: Primary | ICD-10-CM

## 2023-08-25 PROCEDURE — 99213 OFFICE O/P EST LOW 20 MIN: CPT | Performed by: STUDENT IN AN ORGANIZED HEALTH CARE EDUCATION/TRAINING PROGRAM

## 2023-08-25 RX ORDER — DOXYCYCLINE HYCLATE 100 MG/1
100 CAPSULE ORAL 2 TIMES DAILY
Qty: 10 CAPSULE | Refills: 0 | Status: SHIPPED | OUTPATIENT
Start: 2023-08-25

## 2023-08-25 RX ORDER — PREDNISONE 10 MG/1
40 TABLET ORAL DAILY
Qty: 20 TABLET | Refills: 0 | Status: SHIPPED | OUTPATIENT
Start: 2023-08-25 | End: 2023-08-30

## 2023-08-25 NOTE — PROGRESS NOTES
"Chief Complaint  URI (Cough, congestion, body aches x2 days)    Subjective          Stephani Ye presents to [facility]  Cough  This is a new problem. The current episode started in the past 7 days. The problem has been gradually worsening. The problem occurs every few minutes. The cough is Productive of sputum. Associated symptoms include nasal congestion, postnasal drip and rhinorrhea. Pertinent negatives include no chest pain, chills, ear congestion, fever, sore throat or shortness of breath. The symptoms are aggravated by exercise and pollens. Risk factors for lung disease include travel. She has tried a beta-agonist inhaler for the symptoms.     Objective   Vital Signs:   /76 (BP Location: Left arm, Patient Position: Sitting, Cuff Size: Adult)   Pulse 85   Ht 162.6 cm (64\")   Wt 64.4 kg (142 lb)   SpO2 93%   BMI 24.37 kg/mý     Body mass index is 24.37 kg/mý.    Review of Systems   Constitutional:  Negative for chills and fever.   HENT:  Positive for postnasal drip and rhinorrhea. Negative for sore throat.    Respiratory:  Positive for cough. Negative for shortness of breath.    Cardiovascular:  Negative for chest pain.     Past History:  Medical History: has a past medical history of Carpal tunnel syndrome (), Diabetes mellitus (), Elevated blood pressure reading, Emphysema, unspecified, Glaucoma, Hypertension (), and Pregnancy.   Surgical History: has a past surgical history that includes Carpal tunnel release (); Tubal ligation; Tonsillectomy; Hysterectomy; Cosmetic surgery;  section; Breast surgery; and Inguinal hernia repair.   Family History: family history includes Cancer in an other family member; Coronary artery disease in her mother and another family member; Depression in her sister and another family member; Diabetes in her brother; Hypertension in her brother and mother; Osteoarthritis in her mother; Peripheral vascular disease in her " mother.   Social History: reports that she has been smoking cigarettes. She has a 35.00 pack-year smoking history. She has never used smokeless tobacco. She reports current alcohol use. She reports that she does not use drugs.      Current Outpatient Medications:     albuterol sulfate  (90 Base) MCG/ACT inhaler, Inhale 2 puffs Every 4 (Four) Hours As Needed for Wheezing., Disp: 18 g, Rfl: 0    amLODIPine (NORVASC) 5 MG tablet, Take 1 tablet by mouth Daily., Disp: 90 tablet, Rfl: 3    aspirin 81 MG EC tablet, Take 1 tablet by mouth Daily., Disp: 90 tablet, Rfl: 3    atorvastatin (LIPITOR) 10 MG tablet, Take 1 tablet by mouth Daily., Disp: 90 tablet, Rfl: 1    Continuous Blood Gluc Sensor (Dexcom G6 Sensor), CHANGE SENSOR EVERY 10 DAYS, Disp: 3 each, Rfl: 1    Continuous Blood Gluc Sensor (Dexcom G6 Sensor), Check glucose 3-4 times daily and PRN; E11.9, Disp: 3 each, Rfl: 3    Continuous Blood Gluc Transmit (Dexcom G6 Transmitter) misc, 1 each by Other route Daily., Disp: 1 each, Rfl: 1    dorzolamide-timolol (COSOPT) 22.3-6.8 MG/ML ophthalmic solution, Apply  to eye(s) as directed by provider Every 12 (Twelve) Hours., Disp: , Rfl:     doxycycline (VIBRAMYCIN) 100 MG capsule, Take 1 capsule by mouth 2 (Two) Times a Day., Disp: 10 capsule, Rfl: 0    empagliflozin (Jardiance) 10 MG tablet tablet, Take 1 tablet by mouth Daily., Disp: 42 tablet, Rfl: 0    Fluticasone-Salmeterol (Wixela Inhub) 250-50 MCG/ACT DISKUS, Inhale 1 puff 2 (Two) Times a Day., Disp: 1 each, Rfl: 5    Insulin Glargine (Lantus SoloStar) 100 UNIT/ML injection pen, INJECT 60 UNITS DAILY SUBCUTANEOUSLY AS PER INSULIN PROTOCOL EVERY MORNING, Disp: 90 mL, Rfl: 1    linagliptin (Tradjenta) 5 MG tablet tablet, Take 1 tablet by mouth Daily., Disp: 90 tablet, Rfl: 1    losartan (COZAAR) 100 MG tablet, Take 1 tablet by mouth Daily., Disp: 90 tablet, Rfl: 1    metFORMIN (GLUCOPHAGE) 1000 MG tablet, Take 1 tablet by mouth 2 (Two) Times a Day With Meals.,  Disp: 180 tablet, Rfl: 1    metoprolol succinate XL (TOPROL-XL) 50 MG 24 hr tablet, Take 1 tablet by mouth Daily., Disp: 90 tablet, Rfl: 2    predniSONE (DELTASONE) 10 MG tablet, Take 4 tablets by mouth Daily for 5 days., Disp: 20 tablet, Rfl: 0    travoprost, UZMA free, (TRAVATAN) 0.004 % solution ophthalmic solution, PLACE ONE DROP INTO BOTH EYES AS DIRECTED AT BEDTIME, Disp: , Rfl:     zolpidem (AMBIEN) 10 MG tablet, Take 1 tablet by mouth every night at bedtime., Disp: 30 tablet, Rfl: 2    Allergies: Patient has no known allergies.    Physical Exam  Constitutional:       General: She is not in acute distress.     Appearance: She is not ill-appearing or toxic-appearing.   HENT:      Head: Normocephalic and atraumatic.      Right Ear: Ear canal and external ear normal.      Left Ear: Ear canal and external ear normal.      Nose: Congestion and rhinorrhea present.      Mouth/Throat:      Pharynx: Posterior oropharyngeal erythema (cobblestoning) present.   Eyes:      General: No scleral icterus.  Cardiovascular:      Rate and Rhythm: Normal rate and regular rhythm.   Pulmonary:      Effort: Pulmonary effort is normal.      Breath sounds: Wheezing (scattered) present.   Neurological:      Mental Status: She is alert.        Result Review :                   Assessment and Plan    Diagnoses and all orders for this visit:    1. COPD with exacerbation (Primary)    Other orders  -     predniSONE (DELTASONE) 10 MG tablet; Take 4 tablets by mouth Daily for 5 days.  Dispense: 20 tablet; Refill: 0  -     doxycycline (VIBRAMYCIN) 100 MG capsule; Take 1 capsule by mouth 2 (Two) Times a Day.  Dispense: 10 capsule; Refill: 0    Patient with known COPD, which is in acute exacerbation. Will send in Prednisone burst and Doxycycline 100mgBID X5 days. Patient advised to continue current inhalers for maintenance, increase albuterol or nebulizer to [id number] PRN for shortness of breath and wheeze. Call with new or worsening symptoms  including fever, chills, productive sputum or increased work of breathing.      Follow Up   No follow-ups on file.  Patient was given instructions and counseling regarding her condition or for health maintenance advice. Please see specific information pulled into the AVS if appropriate.     Elena Perdomo, DO

## 2023-10-09 ENCOUNTER — PATIENT MESSAGE (OUTPATIENT)
Dept: FAMILY MEDICINE CLINIC | Facility: CLINIC | Age: 63
End: 2023-10-09

## 2023-10-12 ENCOUNTER — TELEPHONE (OUTPATIENT)
Dept: FAMILY MEDICINE CLINIC | Facility: CLINIC | Age: 63
End: 2023-10-12
Payer: COMMERCIAL

## 2023-10-12 ENCOUNTER — PATIENT MESSAGE (OUTPATIENT)
Dept: FAMILY MEDICINE CLINIC | Facility: CLINIC | Age: 63
End: 2023-10-12

## 2023-10-12 NOTE — TELEPHONE ENCOUNTER
Caller: Stephani Ye    Relationship: Self    Best call back number: [phone]    What medications are you currently taking:   Current Outpatient Medications on File Prior to Visit   Medication Sig Dispense Refill    albuterol sulfate  (90 Base) MCG/ACT inhaler Inhale 2 puffs Every 4 (Four) Hours As Needed for Wheezing. 18 g 0    amLODIPine (NORVASC) 5 MG tablet Take 1 tablet by mouth Daily. 90 tablet 3    aspirin 81 MG EC tablet Take 1 tablet by mouth Daily. 90 tablet 3    atorvastatin (LIPITOR) 10 MG tablet Take 1 tablet by mouth Daily. 90 tablet 1    Continuous Blood Gluc Sensor (Dexcom G6 Sensor) CHANGE SENSOR EVERY 10 DAYS 3 each 1    Continuous Blood Gluc Sensor (Dexcom G6 Sensor) Check glucose 3-4 times daily and PRN; E11.9 3 each 3    Continuous Blood Gluc Transmit (Dexcom G6 Transmitter) misc 1 each by Other route Daily. 1 each 1    dorzolamide-timolol (COSOPT) 22.3-6.8 MG/ML ophthalmic solution Apply  to eye(s) as directed by provider Every 12 (Twelve) Hours.      doxycycline (VIBRAMYCIN) 100 MG capsule Take 1 capsule by mouth 2 (Two) Times a Day. 10 capsule 0    empagliflozin (Jardiance) 10 MG tablet tablet Take 1 tablet by mouth Daily. 42 tablet 0    Fluticasone-Salmeterol (Wixela Inhub) 250-50 MCG/ACT DISKUS Inhale 1 puff 2 (Two) Times a Day. 1 each 5    Insulin Glargine (Lantus SoloStar) 100 UNIT/ML injection pen INJECT 60 UNITS DAILY SUBCUTANEOUSLY AS PER INSULIN PROTOCOL EVERY MORNING 90 mL 1    linagliptin (Tradjenta) 5 MG tablet tablet Take 1 tablet by mouth Daily. 90 tablet 1    losartan (COZAAR) 100 MG tablet Take 1 tablet by mouth Daily. 90 tablet 1    metFORMIN (GLUCOPHAGE) 1000 MG tablet Take 1 tablet by mouth 2 (Two) Times a Day With Meals. 180 tablet 1    metoprolol succinate XL (TOPROL-XL) 50 MG 24 hr tablet Take 1 tablet by mouth Daily. 90 tablet 2    travoprost, BAK free, (TRAVATAN) 0.004 % solution ophthalmic solution PLACE ONE DROP INTO BOTH EYES AS DIRECTED AT BEDTIME       zolpidem (AMBIEN) 10 MG tablet Take 1 tablet by mouth every night at bedtime. 30 tablet 2     No current facility-administered medications on file prior to visit.          Which medication are you concerned about: DEXCOM SENSOR    Who prescribed you this medication: SANTOSH ROBISON    What are your concerns: PATIENT HAS NEW INSURANCE WHICH HAS BEEN UPDATED AND NOW NEEDS A PRIOR AUTHORIZATION FOR IT. ALSO PATIENT IS NEEDING A NEW TRANSMITTER. PLEASE ADVISE

## 2023-10-12 NOTE — TELEPHONE ENCOUNTER
Looks like you have dr. Jose marr. She was last seen by you in June. Are you continuing to have patient see you for the refills? She is due for appointment if so

## 2023-10-16 ENCOUNTER — TELEPHONE (OUTPATIENT)
Dept: FAMILY MEDICINE CLINIC | Facility: CLINIC | Age: 63
End: 2023-10-16
Payer: COMMERCIAL

## 2023-10-16 RX ORDER — PROCHLORPERAZINE 25 MG/1
1 SUPPOSITORY RECTAL DAILY
Qty: 1 EACH | Refills: 2 | Status: SHIPPED | OUTPATIENT
Start: 2023-10-16

## 2023-10-16 NOTE — TELEPHONE ENCOUNTER
Regarding: Refill request   Contact: [phone]  ----- Message from Malinda Khan MA sent at 10/12/2023  3:59 PM EDT -----       ----- Message sent from Malinda Khan MA to Stephani Ye at 10/12/2023  3:58 PM -----   Ofe Styles. You have to be seen by the provider authorizing the ambien every 3 months. Tyler is out of office this week and returns Monday. I will send him your message to see if you need to be seen. He will get back to you.    thanks      ----- Message -----       From:Stephani Ye       Sent:10/12/2023  8:09 AM EDT         To:Tyler Edward    Subject:Refill request     Will also need renewal for Ambien soon.  Thank you!

## 2023-10-16 NOTE — TELEPHONE ENCOUNTER
Refill of the Dexcom transmitter sent.  Can someone please check on PA for the Dexcom sensor and get that figured out.  Does she currently need refill of the Ambien?  Let me know.  Thanks

## 2023-10-18 DIAGNOSIS — F51.01 PRIMARY INSOMNIA: ICD-10-CM

## 2023-10-18 RX ORDER — ZOLPIDEM TARTRATE 10 MG/1
10 TABLET ORAL
Qty: 30 TABLET | Refills: 2 | Status: SHIPPED | OUTPATIENT
Start: 2023-10-18

## 2023-10-18 NOTE — TELEPHONE ENCOUNTER
Caller: Stephani Ye    Relationship: Self    Best call back number: [phone]     Requested Prescriptions:   Requested Prescriptions     Pending Prescriptions Disp Refills    zolpidem (AMBIEN) 10 MG tablet 30 tablet 2     Sig: Take 1 tablet by mouth every night at bedtime.        Pharmacy where request should be sent: [facility]/PHARMACY #[id number] - [facility] [address] - [phone]  - [phone] FX     Last office visit with prescribing clinician: 6/7/2023   Last telemedicine visit with prescribing clinician: Visit date not found   Next office visit with prescribing clinician: Visit date not found     Additional details provided by patient: OUT OF MEDICATION     Does the patient have less than a 3 day supply:  [x] Yes  [] No    Would you like a call back once the refill request has been completed: [] Yes [x] No    If the office needs to give you a call back, can they leave a voicemail: [] Yes [x] No    Annamarie Arguello Rep   10/18/23 13:22 EDT

## 2023-10-18 NOTE — TELEPHONE ENCOUNTER
Ofe Garcia, do you care to send in refill of patients ambien. STEPHANIE appropriate. UDS completed 6/7/23. Thanks

## 2023-10-25 ENCOUNTER — TELEPHONE (OUTPATIENT)
Dept: FAMILY MEDICINE CLINIC | Facility: CLINIC | Age: 63
End: 2023-10-25
Payer: COMMERCIAL

## 2023-10-25 NOTE — TELEPHONE ENCOUNTER
HUB TO RELAY: Attempted to contact pt, line just went dead, no answer, unable to LVM. Please give message from Tyler:   Refill of the Dexcom transmitter sent.  Can someone please check on PA for the Dexcom sensor and get that figured out.  Does she currently need refill of the Ambien?  Let me know.  Thanks

## 2023-10-26 ENCOUNTER — TELEPHONE (OUTPATIENT)
Dept: FAMILY MEDICINE CLINIC | Facility: CLINIC | Age: 63
End: 2023-10-26
Payer: COMMERCIAL

## 2023-10-26 NOTE — TELEPHONE ENCOUNTER
Caller: Stephani Ye    Relationship: Self    Best call back number: [phone]     What was the call regarding:   PATIENT STATED THAT SHE IS NEEDING A PRIOR AUTHORIZATION ON HER MEDICATION FOR INSURANCE TO COVER   Continuous Blood Gluc Transmit (Dexcom G6 Transmitter) misc

## 2023-11-01 ENCOUNTER — PATIENT MESSAGE (OUTPATIENT)
Dept: FAMILY MEDICINE CLINIC | Facility: CLINIC | Age: 63
End: 2023-11-01
Payer: COMMERCIAL

## 2023-11-12 ENCOUNTER — PATIENT MESSAGE (OUTPATIENT)
Dept: FAMILY MEDICINE CLINIC | Facility: CLINIC | Age: 63
End: 2023-11-12

## 2023-11-13 DIAGNOSIS — E11.65 TYPE 2 DIABETES MELLITUS WITH HYPERGLYCEMIA, WITH LONG-TERM CURRENT USE OF INSULIN: ICD-10-CM

## 2023-11-13 DIAGNOSIS — Z79.4 TYPE 2 DIABETES MELLITUS WITH HYPERGLYCEMIA, WITH LONG-TERM CURRENT USE OF INSULIN: ICD-10-CM

## 2023-11-22 ENCOUNTER — TELEPHONE (OUTPATIENT)
Dept: CARDIOLOGY | Facility: CLINIC | Age: 63
End: 2023-11-22
Payer: COMMERCIAL

## 2023-11-22 NOTE — TELEPHONE ENCOUNTER
Jardiance   Message from plan: CaseId:[id number];Status:Approved;Review Type:Prior Auth;Coverage Start Date:11/22/2023;Coverage End Date:12/31/2099;

## 2023-12-11 RX ORDER — PROCHLORPERAZINE 25 MG/1
SUPPOSITORY RECTAL
Qty: 3 EACH | Refills: 3 | Status: SHIPPED | OUTPATIENT
Start: 2023-12-11

## 2023-12-11 RX ORDER — PROCHLORPERAZINE 25 MG/1
1 SUPPOSITORY RECTAL DAILY
Qty: 1 EACH | Refills: 2 | Status: SHIPPED | OUTPATIENT
Start: 2023-12-11

## 2023-12-14 ENCOUNTER — PATIENT MESSAGE (OUTPATIENT)
Dept: FAMILY MEDICINE CLINIC | Facility: CLINIC | Age: 63
End: 2023-12-14

## 2023-12-16 ENCOUNTER — PATIENT MESSAGE (OUTPATIENT)
Dept: FAMILY MEDICINE CLINIC | Facility: CLINIC | Age: 63
End: 2023-12-16

## 2023-12-20 DIAGNOSIS — I10 BENIGN ESSENTIAL HTN: ICD-10-CM

## 2023-12-20 RX ORDER — LOSARTAN POTASSIUM 100 MG/1
100 TABLET ORAL DAILY
Qty: 30 TABLET | Refills: 0 | Status: SHIPPED | OUTPATIENT
Start: 2023-12-20

## 2024-01-11 ENCOUNTER — PATIENT MESSAGE (OUTPATIENT)
Dept: FAMILY MEDICINE CLINIC | Facility: CLINIC | Age: 64
End: 2024-01-11

## 2024-01-12 ENCOUNTER — TELEPHONE (OUTPATIENT)
Dept: FAMILY MEDICINE CLINIC | Facility: CLINIC | Age: 64
End: 2024-01-12
Payer: COMMERCIAL

## 2024-01-12 NOTE — TELEPHONE ENCOUNTER
----- Message from Jessica Nichols MA sent at 1/11/2024  1:51 PM EST -----  Regarding: FW: PA needed   Contact: [phone]    ----- Message -----  From: Stephani Ye  Sent: 1/11/2024  10:05 AM EST  To: [facility]par[facility]  Subject: PA needed                                        I’m still awaiting PA for Lantus and Tradjenta at the pharmacy here in [place].   I’m here in town today and tomorrow would really like to pick those up.  I’m on my last syringe of insulin.  Thanks so much!

## 2024-01-12 NOTE — TELEPHONE ENCOUNTER
Her last A1c was OK at 7.2 but if she wants to stay off of it I would recommend we have repeat blood work this month to see where her diabetes is at. Thanks

## 2024-01-12 NOTE — TELEPHONE ENCOUNTER
Did you send the lantus in? Do we know what her insurance will cover then since not the tradjenta?

## 2024-01-12 NOTE — TELEPHONE ENCOUNTER
Pt states that the only reason she was on tradjenta was because there was an issue with another med awhile back but states that she has been without it for a mnth and would like to just stay off of it if that's ok?

## 2024-01-12 NOTE — TELEPHONE ENCOUNTER
Spoke with pharm  Trajenta was approved but now pt has new insurance. I will see if these need a PA and have [facility] send to [facility]

## 2024-01-20 DIAGNOSIS — I10 BENIGN ESSENTIAL HTN: ICD-10-CM

## 2024-01-22 RX ORDER — LOSARTAN POTASSIUM 100 MG/1
100 TABLET ORAL DAILY
Qty: 30 TABLET | Refills: 0 | Status: SHIPPED | OUTPATIENT
Start: 2024-01-22

## 2024-01-22 NOTE — TELEPHONE ENCOUNTER
Pt is due for appt for med recheck, please call and schedule appt with PCP. Sent med x1 month until seen in office.

## 2024-01-23 NOTE — TELEPHONE ENCOUNTER
"LVM for patient.   HUB TO RELAY: \"We have refilled your losartan however you are due for an appt with pcp.\" Please schedule an appt.   "

## 2024-02-12 DIAGNOSIS — F51.01 PRIMARY INSOMNIA: ICD-10-CM

## 2024-02-12 RX ORDER — ZOLPIDEM TARTRATE 10 MG/1
10 TABLET ORAL
Qty: 30 TABLET | Refills: 2 | OUTPATIENT
Start: 2024-02-12

## 2024-02-14 DIAGNOSIS — I10 BENIGN ESSENTIAL HTN: ICD-10-CM

## 2024-02-15 RX ORDER — LOSARTAN POTASSIUM 100 MG/1
100 TABLET ORAL DAILY
Qty: 30 TABLET | Refills: 0 | OUTPATIENT
Start: 2024-02-15

## 2024-02-16 ENCOUNTER — OFFICE VISIT (OUTPATIENT)
Dept: FAMILY MEDICINE CLINIC | Facility: CLINIC | Age: 64
End: 2024-02-16
Payer: COMMERCIAL

## 2024-02-16 VITALS
OXYGEN SATURATION: 97 % | SYSTOLIC BLOOD PRESSURE: 132 MMHG | BODY MASS INDEX: 23.81 KG/M2 | DIASTOLIC BLOOD PRESSURE: 84 MMHG | HEIGHT: 64 IN | HEART RATE: 81 BPM | WEIGHT: 139.44 LBS

## 2024-02-16 DIAGNOSIS — Z72.0 TOBACCO USE: ICD-10-CM

## 2024-02-16 DIAGNOSIS — J43.9 BULLA OF LUNG: ICD-10-CM

## 2024-02-16 DIAGNOSIS — Z79.899 ENCOUNTER FOR LONG-TERM (CURRENT) USE OF OTHER MEDICATIONS: ICD-10-CM

## 2024-02-16 DIAGNOSIS — Z79.4 TYPE 2 DIABETES MELLITUS WITH HYPERGLYCEMIA, WITH LONG-TERM CURRENT USE OF INSULIN: ICD-10-CM

## 2024-02-16 DIAGNOSIS — J44.9 CHRONIC OBSTRUCTIVE PULMONARY DISEASE, UNSPECIFIED COPD TYPE: ICD-10-CM

## 2024-02-16 DIAGNOSIS — I10 BENIGN ESSENTIAL HTN: Primary | ICD-10-CM

## 2024-02-16 DIAGNOSIS — F51.01 PRIMARY INSOMNIA: ICD-10-CM

## 2024-02-16 DIAGNOSIS — E11.65 TYPE 2 DIABETES MELLITUS WITH HYPERGLYCEMIA, WITH LONG-TERM CURRENT USE OF INSULIN: ICD-10-CM

## 2024-02-16 DIAGNOSIS — Z12.4 SCREENING FOR CERVICAL CANCER: ICD-10-CM

## 2024-02-16 LAB
BILIRUB BLD-MCNC: NEGATIVE MG/DL
CLARITY, POC: CLEAR
COLOR UR: YELLOW
EXPIRATION DATE: NORMAL
EXPIRATION DATE: NORMAL
GLUCOSE UR STRIP-MCNC: NEGATIVE MG/DL
KETONES UR QL: NEGATIVE
LEUKOCYTE EST, POC: NEGATIVE
Lab: NORMAL
Lab: NORMAL
NITRITE UR-MCNC: NEGATIVE MG/ML
PH UR: 6 [PH] (ref 5–8)
POC AMPHETAMINES: NEGATIVE
POC BARBITURATES: NEGATIVE
POC BENZODIAZEPHINES: NEGATIVE
POC COCAINE: NEGATIVE
POC CREATININE URINE: 100
POC METHADONE: NEGATIVE
POC METHAMPHETAMINE SCREEN URINE: NEGATIVE
POC MICROALBUMIN URINE: 50
POC OPIATES: NEGATIVE
POC OXYCODONE: NEGATIVE
POC PHENCYCLIDINE: NEGATIVE
POC PROPOXYPHENE: NEGATIVE
POC THC: NEGATIVE
POC TRICYCLIC ANTIDEPRESSANTS: NEGATIVE
PROT UR STRIP-MCNC: NEGATIVE MG/DL
RBC # UR STRIP: NEGATIVE /UL
SP GR UR: 1.01 (ref 1–1.03)
UROBILINOGEN UR QL: NORMAL

## 2024-02-16 RX ORDER — KETOCONAZOLE 20 MG/G
CREAM TOPICAL
COMMUNITY
Start: 2023-12-15

## 2024-02-16 RX ORDER — TIOTROPIUM BROMIDE 18 UG/1
CAPSULE ORAL; RESPIRATORY (INHALATION)
COMMUNITY
Start: 2024-02-12

## 2024-02-16 RX ORDER — PROCHLORPERAZINE 25 MG/1
1 SUPPOSITORY RECTAL DAILY
Qty: 1 EACH | Refills: 2 | Status: SHIPPED | OUTPATIENT
Start: 2024-02-16

## 2024-02-16 RX ORDER — ZOLPIDEM TARTRATE 10 MG/1
10 TABLET ORAL
Qty: 30 TABLET | Refills: 2 | Status: SHIPPED | OUTPATIENT
Start: 2024-02-16

## 2024-02-16 RX ORDER — AZITHROMYCIN 250 MG/1
TABLET, FILM COATED ORAL
COMMUNITY
Start: 2024-02-12

## 2024-02-16 RX ORDER — NETARSUDIL AND LATANOPROST OPHTHALMIC SOLUTION, 0.02%/0.005% .2; .05 MG/ML; MG/ML
SOLUTION/ DROPS OPHTHALMIC; TOPICAL
COMMUNITY
Start: 2023-11-06

## 2024-02-17 LAB
ALBUMIN SERPL-MCNC: 4.9 G/DL (ref 3.9–4.9)
ALBUMIN/GLOB SERPL: 2.2 {RATIO} (ref 1.2–2.2)
ALP SERPL-CCNC: 121 IU/L (ref 44–121)
ALT SERPL-CCNC: 11 IU/L (ref 0–32)
AST SERPL-CCNC: 14 IU/L (ref 0–40)
BASOPHILS # BLD AUTO: 0.1 X10E3/UL (ref 0–0.2)
BASOPHILS NFR BLD AUTO: 1 %
BILIRUB SERPL-MCNC: 0.4 MG/DL (ref 0–1.2)
BUN SERPL-MCNC: 13 MG/DL (ref 8–27)
BUN/CREAT SERPL: 19 (ref 12–28)
CALCIUM SERPL-MCNC: 10 MG/DL (ref 8.7–10.3)
CHLORIDE SERPL-SCNC: 99 MMOL/L (ref 96–106)
CHOLEST SERPL-MCNC: 157 MG/DL (ref 100–199)
CO2 SERPL-SCNC: 22 MMOL/L (ref 20–29)
CREAT SERPL-MCNC: 0.69 MG/DL (ref 0.57–1)
EGFRCR SERPLBLD CKD-EPI 2021: 97 ML/MIN/1.73
EOSINOPHIL # BLD AUTO: 0.2 X10E3/UL (ref 0–0.4)
EOSINOPHIL NFR BLD AUTO: 1 %
ERYTHROCYTE [DISTWIDTH] IN BLOOD BY AUTOMATED COUNT: 12.1 % (ref 11.7–15.4)
GLOBULIN SER CALC-MCNC: 2.2 G/DL (ref 1.5–4.5)
GLUCOSE SERPL-MCNC: 183 MG/DL (ref 70–99)
HBA1C MFR BLD: 7.5 % (ref 4.8–5.6)
HCT VFR BLD AUTO: 45.8 % (ref 34–46.6)
HDLC SERPL-MCNC: 63 MG/DL
HGB BLD-MCNC: 15.8 G/DL (ref 11.1–15.9)
IMM GRANULOCYTES # BLD AUTO: 0 X10E3/UL (ref 0–0.1)
IMM GRANULOCYTES NFR BLD AUTO: 0 %
LDLC SERPL CALC-MCNC: 80 MG/DL (ref 0–99)
LYMPHOCYTES # BLD AUTO: 2.8 X10E3/UL (ref 0.7–3.1)
LYMPHOCYTES NFR BLD AUTO: 20 %
MCH RBC QN AUTO: 32.2 PG (ref 26.6–33)
MCHC RBC AUTO-ENTMCNC: 34.5 G/DL (ref 31.5–35.7)
MCV RBC AUTO: 94 FL (ref 79–97)
MONOCYTES # BLD AUTO: 0.7 X10E3/UL (ref 0.1–0.9)
MONOCYTES NFR BLD AUTO: 5 %
NEUTROPHILS # BLD AUTO: 10.4 X10E3/UL (ref 1.4–7)
NEUTROPHILS NFR BLD AUTO: 73 %
PLATELET # BLD AUTO: 397 X10E3/UL (ref 150–450)
POTASSIUM SERPL-SCNC: 5.2 MMOL/L (ref 3.5–5.2)
PROT SERPL-MCNC: 7.1 G/DL (ref 6–8.5)
RBC # BLD AUTO: 4.9 X10E6/UL (ref 3.77–5.28)
SODIUM SERPL-SCNC: 137 MMOL/L (ref 134–144)
TRIGL SERPL-MCNC: 74 MG/DL (ref 0–149)
TSH SERPL DL<=0.005 MIU/L-ACNC: 0.94 UIU/ML (ref 0.45–4.5)
VLDLC SERPL CALC-MCNC: 14 MG/DL (ref 5–40)
WBC # BLD AUTO: 14.2 X10E3/UL (ref 3.4–10.8)

## 2024-02-20 ENCOUNTER — TELEPHONE (OUTPATIENT)
Dept: FAMILY MEDICINE CLINIC | Facility: CLINIC | Age: 64
End: 2024-02-20
Payer: COMMERCIAL

## 2024-02-20 NOTE — TELEPHONE ENCOUNTER
[facility]    HUB TO RELAY----- Message from RYLEY Long sent at 2/20/2024 12:17 PM EST -----  Please let patient know from labs her diabetes has worsened with her A1c up to 7.5.  Continue to monitor blood sugars but she really needs to watch her diet and cut back on sugar sweets carbs starches and try to exercise 30 minutes most days of the week.  Would she be interested in seeing an endocrinologist for her diabetes?  Let me know if would like that referral.    Her white blood cell count and neutrophils are again high.  Has she seen hematology about these counts?  I would recommend that we set her up with a hematologist so let me know if she would like that referral.

## 2024-02-22 DIAGNOSIS — E11.65 TYPE 2 DIABETES MELLITUS WITH HYPERGLYCEMIA, WITH LONG-TERM CURRENT USE OF INSULIN: Primary | ICD-10-CM

## 2024-02-22 DIAGNOSIS — D72.829 LEUKOCYTOSIS, UNSPECIFIED TYPE: ICD-10-CM

## 2024-02-22 DIAGNOSIS — Z79.4 TYPE 2 DIABETES MELLITUS WITH HYPERGLYCEMIA, WITH LONG-TERM CURRENT USE OF INSULIN: Primary | ICD-10-CM

## 2024-03-13 DIAGNOSIS — I10 BENIGN ESSENTIAL HTN: ICD-10-CM

## 2024-03-13 RX ORDER — METOPROLOL SUCCINATE 50 MG/1
50 TABLET, EXTENDED RELEASE ORAL DAILY
Qty: 30 TABLET | Refills: 0 | Status: SHIPPED | OUTPATIENT
Start: 2024-03-13

## 2024-03-14 RX ORDER — LOSARTAN POTASSIUM 100 MG/1
100 TABLET ORAL DAILY
Qty: 30 TABLET | Refills: 0 | OUTPATIENT
Start: 2024-03-14

## 2024-03-14 RX ORDER — INSULIN GLARGINE 100 [IU]/ML
INJECTION, SOLUTION SUBCUTANEOUS
Refills: 1 | OUTPATIENT
Start: 2024-03-14

## 2024-03-14 RX ORDER — ZOLPIDEM TARTRATE 10 MG/1
TABLET ORAL
Qty: 30 TABLET | Refills: 0 | OUTPATIENT
Start: 2024-03-14

## 2024-03-17 DIAGNOSIS — I10 BENIGN ESSENTIAL HTN: ICD-10-CM

## 2024-03-17 DIAGNOSIS — Z79.4 TYPE 2 DIABETES MELLITUS WITH HYPERGLYCEMIA, WITH LONG-TERM CURRENT USE OF INSULIN: ICD-10-CM

## 2024-03-17 DIAGNOSIS — E78.2 MIXED HYPERLIPIDEMIA: ICD-10-CM

## 2024-03-17 DIAGNOSIS — E11.65 TYPE 2 DIABETES MELLITUS WITH HYPERGLYCEMIA, WITH LONG-TERM CURRENT USE OF INSULIN: ICD-10-CM

## 2024-03-18 RX ORDER — ATORVASTATIN CALCIUM 10 MG/1
10 TABLET, FILM COATED ORAL DAILY
Qty: 90 TABLET | Refills: 0 | Status: SHIPPED | OUTPATIENT
Start: 2024-03-18

## 2024-03-18 RX ORDER — LOSARTAN POTASSIUM 100 MG/1
100 TABLET ORAL DAILY
Qty: 30 TABLET | Refills: 0 | Status: SHIPPED | OUTPATIENT
Start: 2024-03-18

## 2024-04-19 DIAGNOSIS — I10 BENIGN ESSENTIAL HTN: ICD-10-CM

## 2024-04-19 RX ORDER — PROCHLORPERAZINE 25 MG/1
SUPPOSITORY RECTAL
Qty: 3 EACH | Refills: 3 | Status: SHIPPED | OUTPATIENT
Start: 2024-04-19

## 2024-04-19 RX ORDER — METOPROLOL SUCCINATE 50 MG/1
50 TABLET, EXTENDED RELEASE ORAL DAILY
Qty: 90 TABLET | Refills: 0 | Status: SHIPPED | OUTPATIENT
Start: 2024-04-19

## 2024-04-21 DIAGNOSIS — I10 BENIGN ESSENTIAL HTN: ICD-10-CM

## 2024-04-22 RX ORDER — LOSARTAN POTASSIUM 100 MG/1
100 TABLET ORAL DAILY
Qty: 30 TABLET | Refills: 0 | Status: SHIPPED | OUTPATIENT
Start: 2024-04-22

## 2024-05-07 DIAGNOSIS — J44.9 CHRONIC OBSTRUCTIVE PULMONARY DISEASE, UNSPECIFIED COPD TYPE: ICD-10-CM

## 2024-05-07 RX ORDER — FLUTICASONE PROPIONATE AND SALMETEROL 250; 50 UG/1; UG/1
1 POWDER RESPIRATORY (INHALATION) 2 TIMES DAILY
Qty: 60 EACH | Refills: 0 | Status: SHIPPED | OUTPATIENT
Start: 2024-05-07

## 2024-05-13 ENCOUNTER — E-VISIT (OUTPATIENT)
Dept: ADMINISTRATIVE | Facility: OTHER | Age: 64
End: 2024-05-13
Payer: COMMERCIAL

## 2024-05-13 NOTE — E-VISIT ESCALATED
Chief Complaint: Mouth sores   Patient was shown the following escalation message:   Some conditions need a visit with a healthcare provider   Mouth sores with symptoms on other parts of your body may suggest a more complicated condition. You should speak with a provider to get care.   ----------   Patient Interview Transcript:   Are your symptoms on the inside or the outside of your mouth? - Inside includes your tongue, inner cheek or lips, gums, or roof of mouth - Outside includes your lips, skin surrounding lips, or corners of mouth Select all that apply.    Outside   Not selected:    Inside   Which areas outside your mouth are affected? Select all that apply.    On or just above my upper lip   Not selected:    On or just below my lower lip    The corners of my mouth   Which of these best describe the symptoms outside your mouth? Select all that apply.    Bumps or lumps    Burning    Cracking    Itching    Pain or tenderness    Redness    Scaling or flaking skin    Sores or blisters    Other (specify): History of similar   Not selected:    A cottony feeling    Darkening of the skin    Swelling   How long have you had these symptoms? Select one.    1 to 3 days   Not selected:    4 to 6 days    7 to 10 days    More than 10 days   How many mouth lesions, sores, or bumps do you have? Select one.    1   Not selected:    2 to 4    5 to 10    More than 10   Mouth sores can interfere with talking, eating, or drinking. How much does the mouth sore affect your ability to do these things? Select one.    Not at all   Not selected:    Somewhat, but I can still talk, eat, and drink    It makes talking, eating, or drinking impossible   Do you have any loss of taste? Select one.    No   Not selected:    Yes   Is there any swelling, redness, or warmth on your cheek or any other part of your face (not including the mouth area)? Select one.    No, not that I can tell   Not selected:    Yes   Before your symptoms began, did you  feel any tingling, pain, or burning in the area? Select one.    Yes   Not selected:    No, not that I remember   To recommend the best treatment for you, we need to see photos of the affected area of your mouth.   [image: /[email or web address]]   This shows the size and location of a sore on the upper lip.   [image: /[email or web address]]   This shows close-up detail of a sore on the upper lip.   If you choose not to send photos, you'll need to speak with a provider to get care.    Select one.    OK, I'll send photos   Not selected:    I'd rather not send photos. Show me my care options.   Send up to three photos for the provider to review: - Don't use a flash. - Make sure the photos are in focus. - Take at least one close-up photo of the affected area. - Take a photo showing the location in or around your mouth. - Take a photo showing your entire face.    Upload 1    Upload 2   Not selected:    Upload 3   Do you have tightness or discomfort when opening your mouth wide? Select one.    No   Not selected:    Yes   Do you have any sores (or rash-like blisters) on other parts of your body that can't be explained by another condition? Select all that apply.    Nose   Not selected:    Chin    Cheeks    Forehead    Arms, legs, or upper body    Palms of hands    Soles of feet    Genitals    No   ----------   Medical history   Medical history data does not currently exist for this patient.

## 2024-05-22 DIAGNOSIS — I10 BENIGN ESSENTIAL HTN: ICD-10-CM

## 2024-05-22 RX ORDER — LOSARTAN POTASSIUM 100 MG/1
100 TABLET ORAL DAILY
Qty: 30 TABLET | Refills: 0 | Status: SHIPPED | OUTPATIENT
Start: 2024-05-22

## 2024-06-03 DIAGNOSIS — F51.01 PRIMARY INSOMNIA: ICD-10-CM

## 2024-06-03 NOTE — TELEPHONE ENCOUNTER
Caller: Stephani Ye    Relationship: Self    Best call back number: [phone]     Requested Prescriptions:   Requested Prescriptions     Pending Prescriptions Disp Refills    zolpidem (AMBIEN) 10 MG tablet 30 tablet 2     Sig: Take 1 tablet by mouth every night at bedtime.        Pharmacy where request should be sent: [facility]/PHARMACY #[id number] - [facility] [address] - [phone]  - [phone] FX     Last office visit with prescribing clinician: 2/16/2024   Last telemedicine visit with prescribing clinician: Visit date not found   Next office visit with prescribing clinician: Visit date not found     Additional details provided by patient: OUT OF MEDICATION     Does the patient have less than a 3 day supply:  [x] Yes  [] No    Would you like a call back once the refill request has been completed: [] Yes [x] No    If the office needs to give you a call back, can they leave a voicemail: [] Yes [x] No    Annamarie Escoto Rep   06/03/24 15:15 EDT

## 2024-06-04 DIAGNOSIS — Z79.4 TYPE 2 DIABETES MELLITUS WITH HYPERGLYCEMIA, WITH LONG-TERM CURRENT USE OF INSULIN: ICD-10-CM

## 2024-06-04 DIAGNOSIS — E11.65 TYPE 2 DIABETES MELLITUS WITH HYPERGLYCEMIA, WITH LONG-TERM CURRENT USE OF INSULIN: ICD-10-CM

## 2024-06-04 DIAGNOSIS — I10 BENIGN ESSENTIAL HTN: ICD-10-CM

## 2024-06-04 RX ORDER — ZOLPIDEM TARTRATE 10 MG/1
10 TABLET ORAL
Qty: 30 TABLET | Refills: 2 | Status: SHIPPED | OUTPATIENT
Start: 2024-06-04

## 2024-06-05 ENCOUNTER — TELEPHONE (OUTPATIENT)
Dept: FAMILY MEDICINE CLINIC | Facility: CLINIC | Age: 64
End: 2024-06-05
Payer: COMMERCIAL

## 2024-06-05 RX ORDER — LOSARTAN POTASSIUM 100 MG/1
100 TABLET ORAL DAILY
Qty: 30 TABLET | Refills: 0 | Status: SHIPPED | OUTPATIENT
Start: 2024-06-05

## 2024-06-05 RX ORDER — TIOTROPIUM BROMIDE 18 UG/1
CAPSULE ORAL; RESPIRATORY (INHALATION)
OUTPATIENT
Start: 2024-06-05

## 2024-06-16 DIAGNOSIS — J44.9 CHRONIC OBSTRUCTIVE PULMONARY DISEASE, UNSPECIFIED COPD TYPE: ICD-10-CM

## 2024-06-17 RX ORDER — FLUTICASONE PROPIONATE AND SALMETEROL 250; 50 UG/1; UG/1
1 POWDER RESPIRATORY (INHALATION) 2 TIMES DAILY
Qty: 60 EACH | Refills: 0 | Status: SHIPPED | OUTPATIENT
Start: 2024-06-17

## 2024-06-17 RX ORDER — ASPIRIN 81 MG/1
81 TABLET, COATED ORAL DAILY
Qty: 30 TABLET | Refills: 11 | OUTPATIENT
Start: 2024-06-17

## 2024-06-17 RX ORDER — AMLODIPINE BESYLATE 5 MG/1
5 TABLET ORAL DAILY
Qty: 30 TABLET | Refills: 11 | OUTPATIENT
Start: 2024-06-17

## 2024-07-03 DIAGNOSIS — E78.2 MIXED HYPERLIPIDEMIA: ICD-10-CM

## 2024-07-03 RX ORDER — ATORVASTATIN CALCIUM 10 MG/1
10 TABLET, FILM COATED ORAL DAILY
Qty: 30 TABLET | Refills: 0 | Status: SHIPPED | OUTPATIENT
Start: 2024-07-03

## 2024-07-30 DIAGNOSIS — E78.2 MIXED HYPERLIPIDEMIA: ICD-10-CM

## 2024-07-30 RX ORDER — ATORVASTATIN CALCIUM 10 MG/1
10 TABLET, FILM COATED ORAL DAILY
Qty: 30 TABLET | Refills: 0 | Status: SHIPPED | OUTPATIENT
Start: 2024-07-30

## 2024-08-18 DIAGNOSIS — Z79.4 TYPE 2 DIABETES MELLITUS WITH HYPERGLYCEMIA, WITH LONG-TERM CURRENT USE OF INSULIN: ICD-10-CM

## 2024-08-18 DIAGNOSIS — E11.65 TYPE 2 DIABETES MELLITUS WITH HYPERGLYCEMIA, WITH LONG-TERM CURRENT USE OF INSULIN: ICD-10-CM

## 2024-08-18 DIAGNOSIS — I10 BENIGN ESSENTIAL HTN: ICD-10-CM

## 2024-08-19 RX ORDER — LOSARTAN POTASSIUM 100 MG/1
100 TABLET ORAL DAILY
Qty: 30 TABLET | Refills: 0 | Status: SHIPPED | OUTPATIENT
Start: 2024-08-19

## 2024-08-19 RX ORDER — INSULIN GLARGINE 100 [IU]/ML
INJECTION, SOLUTION SUBCUTANEOUS
Qty: 45 ML | Refills: 1 | Status: SHIPPED | OUTPATIENT
Start: 2024-08-19

## 2024-08-26 DIAGNOSIS — Z79.4 TYPE 2 DIABETES MELLITUS WITH HYPERGLYCEMIA, WITH LONG-TERM CURRENT USE OF INSULIN: ICD-10-CM

## 2024-08-26 DIAGNOSIS — E78.2 MIXED HYPERLIPIDEMIA: ICD-10-CM

## 2024-08-26 DIAGNOSIS — E11.65 TYPE 2 DIABETES MELLITUS WITH HYPERGLYCEMIA, WITH LONG-TERM CURRENT USE OF INSULIN: ICD-10-CM

## 2024-08-27 RX ORDER — ATORVASTATIN CALCIUM 10 MG/1
10 TABLET, FILM COATED ORAL DAILY
Qty: 30 TABLET | Refills: 0 | Status: SHIPPED | OUTPATIENT
Start: 2024-08-27

## 2024-08-28 DIAGNOSIS — F51.01 PRIMARY INSOMNIA: ICD-10-CM

## 2024-08-28 DIAGNOSIS — E78.2 MIXED HYPERLIPIDEMIA: ICD-10-CM

## 2024-08-28 RX ORDER — ZOLPIDEM TARTRATE 10 MG/1
10 TABLET ORAL
Qty: 30 TABLET | Refills: 2 | Status: CANCELLED | OUTPATIENT
Start: 2024-08-28

## 2024-08-30 DIAGNOSIS — F51.01 PRIMARY INSOMNIA: ICD-10-CM

## 2024-09-03 RX ORDER — ZOLPIDEM TARTRATE 10 MG/1
10 TABLET ORAL
Qty: 30 TABLET | Refills: 2 | Status: SHIPPED | OUTPATIENT
Start: 2024-09-03

## 2024-09-05 RX ORDER — PROCHLORPERAZINE 25 MG/1
SUPPOSITORY RECTAL
Qty: 3 EACH | Refills: 3 | Status: SHIPPED | OUTPATIENT
Start: 2024-09-05

## 2024-09-23 ENCOUNTER — TELEPHONE (OUTPATIENT)
Dept: FAMILY MEDICINE CLINIC | Facility: CLINIC | Age: 64
End: 2024-09-23
Payer: COMMERCIAL

## 2024-09-23 DIAGNOSIS — E11.65 TYPE 2 DIABETES MELLITUS WITH HYPERGLYCEMIA, WITH LONG-TERM CURRENT USE OF INSULIN: ICD-10-CM

## 2024-09-23 DIAGNOSIS — I10 BENIGN ESSENTIAL HTN: ICD-10-CM

## 2024-09-23 DIAGNOSIS — Z79.4 TYPE 2 DIABETES MELLITUS WITH HYPERGLYCEMIA, WITH LONG-TERM CURRENT USE OF INSULIN: ICD-10-CM

## 2024-09-23 RX ORDER — LOSARTAN POTASSIUM 100 MG/1
100 TABLET ORAL DAILY
Qty: 90 TABLET | Refills: 0 | Status: SHIPPED | OUTPATIENT
Start: 2024-09-23

## 2024-09-23 RX ORDER — PROCHLORPERAZINE 25 MG/1
1 SUPPOSITORY RECTAL DAILY
Qty: 1 EACH | Refills: 2 | Status: SHIPPED | OUTPATIENT
Start: 2024-09-23

## 2024-09-24 DIAGNOSIS — I10 BENIGN ESSENTIAL HTN: ICD-10-CM

## 2024-09-24 RX ORDER — AMLODIPINE BESYLATE 5 MG/1
5 TABLET ORAL DAILY
Qty: 90 TABLET | Refills: 0 | Status: SHIPPED | OUTPATIENT
Start: 2024-09-24

## 2024-09-24 RX ORDER — METOPROLOL SUCCINATE 50 MG/1
50 TABLET, EXTENDED RELEASE ORAL DAILY
Qty: 90 TABLET | Refills: 0 | Status: SHIPPED | OUTPATIENT
Start: 2024-09-24

## 2024-10-17 DIAGNOSIS — E78.2 MIXED HYPERLIPIDEMIA: ICD-10-CM

## 2024-10-17 DIAGNOSIS — E11.65 TYPE 2 DIABETES MELLITUS WITH HYPERGLYCEMIA, WITH LONG-TERM CURRENT USE OF INSULIN: ICD-10-CM

## 2024-10-17 DIAGNOSIS — Z79.4 TYPE 2 DIABETES MELLITUS WITH HYPERGLYCEMIA, WITH LONG-TERM CURRENT USE OF INSULIN: ICD-10-CM

## 2024-10-17 RX ORDER — ATORVASTATIN CALCIUM 10 MG/1
10 TABLET, FILM COATED ORAL DAILY
Qty: 30 TABLET | Refills: 0 | Status: SHIPPED | OUTPATIENT
Start: 2024-10-17

## 2024-10-17 NOTE — TELEPHONE ENCOUNTER
Pt is past due for med recheck appt, please call pt and schedule appt with PCP. Rx sent x 30 days until seen in office for further refills.

## 2024-10-18 NOTE — TELEPHONE ENCOUNTER
"Relay     \"LVM - PATIENT IS DUR FOR A FOLLOW UP WITH PCP, PLEASE SCHEDULE WITH SANTOSH ROBISON\"                "

## 2024-10-30 ENCOUNTER — OFFICE VISIT (OUTPATIENT)
Dept: FAMILY MEDICINE CLINIC | Facility: CLINIC | Age: 64
End: 2024-10-30
Payer: COMMERCIAL

## 2024-10-30 ENCOUNTER — OFFICE VISIT (OUTPATIENT)
Dept: CARDIOLOGY | Facility: CLINIC | Age: 64
End: 2024-10-30
Payer: COMMERCIAL

## 2024-10-30 VITALS
SYSTOLIC BLOOD PRESSURE: 138 MMHG | WEIGHT: 139.13 LBS | DIASTOLIC BLOOD PRESSURE: 84 MMHG | OXYGEN SATURATION: 93 % | HEART RATE: 97 BPM | HEIGHT: 64 IN | BODY MASS INDEX: 23.75 KG/M2

## 2024-10-30 VITALS
BODY MASS INDEX: 23.73 KG/M2 | TEMPERATURE: 98 F | SYSTOLIC BLOOD PRESSURE: 134 MMHG | HEART RATE: 72 BPM | WEIGHT: 139 LBS | DIASTOLIC BLOOD PRESSURE: 76 MMHG | HEIGHT: 64 IN | RESPIRATION RATE: 18 BRPM | OXYGEN SATURATION: 91 %

## 2024-10-30 DIAGNOSIS — E11.65 TYPE 2 DIABETES MELLITUS WITH HYPERGLYCEMIA, WITH LONG-TERM CURRENT USE OF INSULIN: ICD-10-CM

## 2024-10-30 DIAGNOSIS — Z79.4 TYPE 2 DIABETES MELLITUS WITH HYPERGLYCEMIA, WITH LONG-TERM CURRENT USE OF INSULIN: ICD-10-CM

## 2024-10-30 DIAGNOSIS — E78.00 HYPERCHOLESTEROLEMIA: ICD-10-CM

## 2024-10-30 DIAGNOSIS — I25.10 CORONARY ARTERY DISEASE INVOLVING NATIVE CORONARY ARTERY OF NATIVE HEART WITHOUT ANGINA PECTORIS: ICD-10-CM

## 2024-10-30 DIAGNOSIS — J43.9 BULLA OF LUNG: ICD-10-CM

## 2024-10-30 DIAGNOSIS — Z12.31 ENCOUNTER FOR SCREENING MAMMOGRAM FOR MALIGNANT NEOPLASM OF BREAST: ICD-10-CM

## 2024-10-30 DIAGNOSIS — Z12.4 SCREENING FOR CERVICAL CANCER: ICD-10-CM

## 2024-10-30 DIAGNOSIS — Z12.11 SCREENING FOR COLON CANCER: ICD-10-CM

## 2024-10-30 DIAGNOSIS — Z72.0 TOBACCO USE: ICD-10-CM

## 2024-10-30 DIAGNOSIS — E78.2 MIXED HYPERLIPIDEMIA: ICD-10-CM

## 2024-10-30 DIAGNOSIS — J44.9 CHRONIC OBSTRUCTIVE PULMONARY DISEASE, UNSPECIFIED COPD TYPE: ICD-10-CM

## 2024-10-30 DIAGNOSIS — Z12.2 SCREENING FOR LUNG CANCER: ICD-10-CM

## 2024-10-30 DIAGNOSIS — I10 BENIGN ESSENTIAL HTN: Primary | ICD-10-CM

## 2024-10-30 DIAGNOSIS — Z00.00 ANNUAL PHYSICAL EXAM: Primary | ICD-10-CM

## 2024-10-30 DIAGNOSIS — F51.01 PRIMARY INSOMNIA: ICD-10-CM

## 2024-10-30 DIAGNOSIS — Z79.899 ENCOUNTER FOR LONG-TERM (CURRENT) USE OF MEDICATIONS: ICD-10-CM

## 2024-10-30 DIAGNOSIS — I10 BENIGN ESSENTIAL HTN: ICD-10-CM

## 2024-10-30 DIAGNOSIS — H40.9 GLAUCOMA, UNSPECIFIED GLAUCOMA TYPE, UNSPECIFIED LATERALITY: ICD-10-CM

## 2024-10-30 PROBLEM — Z11.59 NEED FOR HEPATITIS C SCREENING TEST: Status: RESOLVED | Noted: 2023-06-07 | Resolved: 2024-10-30

## 2024-10-30 PROBLEM — J01.00 ACUTE NON-RECURRENT MAXILLARY SINUSITIS: Status: RESOLVED | Noted: 2023-01-05 | Resolved: 2024-10-30

## 2024-10-30 PROBLEM — R06.02 SOB (SHORTNESS OF BREATH): Status: RESOLVED | Noted: 2023-06-30 | Resolved: 2024-10-30

## 2024-10-30 LAB
BILIRUB BLD-MCNC: NEGATIVE MG/DL
CLARITY, POC: CLEAR
COLOR UR: YELLOW
EXPIRATION DATE: NORMAL
EXPIRATION DATE: NORMAL
GLUCOSE UR STRIP-MCNC: NEGATIVE MG/DL
KETONES UR QL: NEGATIVE
LEUKOCYTE EST, POC: NEGATIVE
Lab: NORMAL
Lab: NORMAL
NITRITE UR-MCNC: NEGATIVE MG/ML
PH UR: 5.5 [PH] (ref 5–8)
POC AMPHETAMINES: NEGATIVE
POC BARBITURATES: NEGATIVE
POC BENZODIAZEPHINES: NEGATIVE
POC COCAINE: NEGATIVE
POC CREATININE URINE: 10
POC METHADONE: NEGATIVE
POC METHAMPHETAMINE SCREEN URINE: NEGATIVE
POC MICROALBUMIN URINE: 10
POC OPIATES: NEGATIVE
POC OXYCODONE: NEGATIVE
POC PHENCYCLIDINE: NEGATIVE
POC PROPOXYPHENE: NEGATIVE
POC THC: NEGATIVE
POC TRICYCLIC ANTIDEPRESSANTS: NEGATIVE
PROT UR STRIP-MCNC: NEGATIVE MG/DL
RBC # UR STRIP: NEGATIVE /UL
SP GR UR: 1.01 (ref 1–1.03)
UROBILINOGEN UR QL: NORMAL

## 2024-10-30 PROCEDURE — 93000 ELECTROCARDIOGRAM COMPLETE: CPT | Performed by: INTERNAL MEDICINE

## 2024-10-30 PROCEDURE — 90677 PCV20 VACCINE IM: CPT | Performed by: NURSE PRACTITIONER

## 2024-10-30 PROCEDURE — 90471 IMMUNIZATION ADMIN: CPT | Performed by: NURSE PRACTITIONER

## 2024-10-30 PROCEDURE — 99396 PREV VISIT EST AGE 40-64: CPT | Performed by: NURSE PRACTITIONER

## 2024-10-30 PROCEDURE — 81003 URINALYSIS AUTO W/O SCOPE: CPT | Performed by: NURSE PRACTITIONER

## 2024-10-30 PROCEDURE — 82044 UR ALBUMIN SEMIQUANTITATIVE: CPT | Performed by: NURSE PRACTITIONER

## 2024-10-30 PROCEDURE — 99214 OFFICE O/P EST MOD 30 MIN: CPT | Performed by: INTERNAL MEDICINE

## 2024-10-30 RX ORDER — AMLODIPINE BESYLATE 5 MG/1
5 TABLET ORAL DAILY
Qty: 90 TABLET | Refills: 3 | Status: SHIPPED | OUTPATIENT
Start: 2024-10-30

## 2024-10-30 RX ORDER — DUPILUMAB 300 MG/2ML
300 INJECTION, SOLUTION SUBCUTANEOUS
COMMUNITY
Start: 2024-08-14

## 2024-10-30 RX ORDER — ATORVASTATIN CALCIUM 20 MG/1
20 TABLET, FILM COATED ORAL DAILY
Qty: 90 TABLET | Refills: 3 | Status: SHIPPED | OUTPATIENT
Start: 2024-10-30

## 2024-10-30 RX ORDER — CANDESARTAN 32 MG/1
32 TABLET ORAL DAILY
Qty: 90 TABLET | Refills: 3 | Status: SHIPPED | OUTPATIENT
Start: 2024-10-30

## 2024-10-30 RX ORDER — FLUTICASONE FUROATE, UMECLIDINIUM BROMIDE AND VILANTEROL TRIFENATATE 200; 62.5; 25 UG/1; UG/1; UG/1
POWDER RESPIRATORY (INHALATION)
COMMUNITY

## 2024-10-30 RX ORDER — ATORVASTATIN CALCIUM 10 MG/1
10 TABLET, FILM COATED ORAL DAILY
Qty: 90 TABLET | Refills: 3 | Status: SHIPPED | OUTPATIENT
Start: 2024-10-30 | End: 2024-10-30 | Stop reason: SDUPTHER

## 2024-10-30 RX ORDER — METOPROLOL SUCCINATE 50 MG/1
50 TABLET, EXTENDED RELEASE ORAL DAILY
Qty: 90 TABLET | Refills: 3 | Status: SHIPPED | OUTPATIENT
Start: 2024-10-30

## 2024-10-30 RX ORDER — FUROSEMIDE 20 MG/1
20 TABLET ORAL DAILY
Qty: 30 TABLET | Refills: 3 | Status: SHIPPED | OUTPATIENT
Start: 2024-10-30

## 2024-10-30 RX ORDER — INSULIN GLARGINE 100 [IU]/ML
INJECTION, SOLUTION SUBCUTANEOUS
Qty: 45 ML | Refills: 2 | Status: SHIPPED | OUTPATIENT
Start: 2024-10-30

## 2024-10-30 NOTE — PROGRESS NOTES
[facility] [place]  [facility]  [address] [place] [address]  Dept: [phone]  Dept Fax: [phone]    Stephani Ye  1960    Follow Up Office Visit Note    History of Present Illness:  Stephani Ye is a 64 y.o. female who presents to the clinic for Follow-up. CAD- by Ct calcifications, denies any chest pain, stress nuclear was normal on ASA BP is 140.80 will  use Candesartan rateher than Losartan 32 mg, will keep Amlodipine 5 mg and also Toprol xl 50 mg, she has been at the [facility] with COPD exacerbation    The following portions of the patient's history were reviewed and updated as appropriate: allergies, current medications, past family history, past medical history, past social history, past surgical history, and problem list.    Medications:  albuterol sulfate HFA  amLODIPine  aspirin  atorvastatin  candesartan  Dexcom G6 Sensor  Dexcom G6 Transmitter misc  Dupixent solution prefilled syringe  empagliflozin tablet  furosemide  ketoconazole  Lantus SoloStar solution pen-injector  losartan  metFORMIN  metoprolol succinate XL  Rocklatan solution  zolpidem    Subjective  No Known Allergies     Past Medical History:   Diagnosis Date   • Carpal tunnel syndrome    • Diabetes mellitus    • Elevated blood pressure reading    • Emphysema, unspecified    • Glaucoma    • Hypertension    • Pregnancy     ,,       Past Surgical History:   Procedure Laterality Date   • BREAST SURGERY     • CARPAL TUNNEL RELEASE     •  SECTION     • COSMETIC SURGERY     • HYSTERECTOMY     • INGUINAL HERNIA REPAIR     • TONSILLECTOMY     • TUBAL ABDOMINAL LIGATION         Family History   Problem Relation Age of Onset   • Hypertension Mother    • Osteoarthritis Mother    • Coronary artery disease Mother    • Peripheral vascular disease Mother    • Depression Sister    • Diabetes Brother    • Hypertension Brother    • Coronary artery disease Other          "PREMATURE   • Depression Other    • Cancer Other         Social History     Socioeconomic History   • Marital status: Single   Tobacco Use   • Smoking status: Heavy Smoker     Current packs/day: 1.00     Average packs/day: 1 pack/day for 35.0 years (35.0 ttl pk-yrs)     Types: Cigarettes   • Smokeless tobacco: Never   Vaping Use   • Vaping status: Never Used   Substance and Sexual Activity   • Alcohol use: Yes     Comment: Rarely   • Drug use: Never   • Sexual activity: Not Currently     Partners: Male     Birth control/protection: Post-menopausal       Review of Systems   Constitutional: Negative.    HENT: Negative.     Respiratory: Negative.     Cardiovascular: Negative.    Endocrine: Negative.    Genitourinary: Negative.    Musculoskeletal: Negative.    Skin: Negative.    Allergic/Immunologic: Negative.    Neurological: Negative.    Hematological: Negative.    Psychiatric/Behavioral: Negative.       Cardiovascular Procedures    ECHO/MUGA:  STRESS TESTS:   CARDIAC CATH:   DEVICES:   HOLTER:   CT/MRI:   VASCULAR:   CARDIOTHORACIC:     Objective  Vitals:    10/30/24 1310   BP: 134/76   BP Location: Right arm   Patient Position: Lying   Cuff Size: Adult   Pulse: 72   Resp: 18   Temp: 98 °F (36.7 °C)   TempSrc: Infrared   SpO2: 91%   Weight: 63 kg (139 lb)   Height: 162.6 cm (64\")   PainSc: 0-No pain     Body mass index is 23.86 kg/m².     Physical Exam  Vitals reviewed.   Constitutional:       Appearance: Healthy appearance. Not in distress.   Neck:      Vascular: No JVR. JVD normal.   Pulmonary:      Effort: Pulmonary effort is normal.      Breath sounds: Normal breath sounds. No wheezing. No rhonchi. No rales.   Chest:      Chest wall: Not tender to palpatation.   Cardiovascular:      PMI at left midclavicular line. Normal rate. Regular rhythm. Normal S1. Normal S2.       Murmurs: There is no murmur.      No gallop.  No click. No rub.   Pulses:     Intact distal pulses.   Edema:     Peripheral edema absent. "   Abdominal:      General: Bowel sounds are normal.      Palpations: Abdomen is soft.      Tenderness: There is no abdominal tenderness.   Musculoskeletal: Normal range of motion.         General: No tenderness. Skin:     General: Skin is warm and dry.   Neurological:      General: No focal deficit present.      Mental Status: Alert and oriented to person, place and time.        Diagnostic Data    ECG 12 Lead    Date/Time: 10/30/2024 1:32 PM  Performed by: Hiro Mix MD    Authorized by: Hiro Mix MD  Comparison: compared with previous ECG from 6/14/2023  Similar to previous ECG  Rhythm: sinus rhythm  Rate: normal  BPM: 95  QRS axis: normal    Clinical impression: normal ECG        Assessment and Plan  Diagnoses and all orders for this visit:    Benign essential HTN- BP is 140.80, will use Candesartan instead Losartan, keep Amlodipine 5mg and also toprol xl 50 mg  -     metoprolol succinate XL (TOPROL-XL) 50 MG 24 hr tablet; Take 1 tablet by mouth Daily.    Coronary artery disease involving native coronary artery of native heart without angina pectoris    Hypercholesterolemia- on Lipitor 10 mg last LDL was 80, will increase to 20 mg    Tobacco use- Still smoking advised to quit    Type 2 diabetes mellitus with hyperglycemia, with long-term current use of insulin    Other orders  -     Dupilumab (Dupixent) 300 MG/2ML solution prefilled syringe; Inject 2 mL under the skin into the appropriate area as directed.  -     amLODIPine (NORVASC) 5 MG tablet; Take 1 tablet by mouth Daily.  -     candesartan (ATACAND) 32 MG tablet; Take 1 tablet by mouth Daily.  -     furosemide (LASIX) 20 MG tablet; Take 1 tablet by mouth Daily. Take 1 pill PRN daily  -     empagliflozin (Jardiance) 10 MG tablet tablet; Take 1 tablet by mouth Daily.         Return in about 6 months (around 4/30/2025) for Recheck with Dr. Mix.    Hiro Mix MD  10/30/2024

## 2024-10-30 NOTE — ASSESSMENT & PLAN NOTE
Labs drawn.  UA completed.  Micro completed.  Goal blood pressure less than 140/90.  Let me or cardiology know if gets to or above that.  PHQ-9 completed and discussed.  No thoughts of suicide or hurting herself or anyone else.  Proper diet and exercise plan discussed and encouraged.  Check feet daily.  She is due a mammogram.  She will discuss Pap smears with gynecology.  Colonoscopy up-to-date.  Continue annual dental and eye exams.  She is due a low-dose lung CT scan and states she will discuss that further with her pulmonologist.  Continue to follow-up with cardiology and pulmonology.  Smoking cessation discussed and encouraged.  She denies all immunizations and understands the risks.  UDS completed and on file.  She will let me know when she needs refills of Ambien.  Risk of meds discussed and understood.  Education provided.  Pneumonia 20 vaccine given today in clinic.  Vaccine information sheet given.  Risk discussed and understood.  Patient tolerated well.  States she will consider tetanus COVID shingles vaccines at a later time at her pharmacy.  Return to clinic or ED with any issues or concerns.

## 2024-10-30 NOTE — PROGRESS NOTES
"Chief Complaint  Hypertension, Hyperlipidemia, and Diabetes    Subjective          Stephani Ye presents to [facility]  History of Present Illness    Presents for annual exam.  Continues to smoke.  No alcohol use.  States doing well on current medications with no issues or side effects.  Tries to watch her diet she does stay active.  Continues to follow-up with her cardiologist Dr. Mix and her pulmonologist Dr.Amrik Ye (in [place]).      No dizziness no headache no chest pain no chest pressure no shortness of breath no trouble breathing no urinary or bowel issues.  Overall states she is doing well.      States she has already had this season's flu shot.  States last Pap smear was about 2 years ago but states she has had a full hysterectomy so denies them further.  Colonoscopy completed 7/2020 and good for 10 years.  Based on our records she is due a mammogram and a low-dose lung CT scan. States feet fine with no cuts/sores.     She states she has already had this season's flu shot.  She would like to get the pneumonia vaccine today.      History of Present Illness      Objective   Vital Signs:   /84   Pulse 97   Ht 162.6 cm (64\")   Wt 63.1 kg (139 lb 2 oz)   SpO2 93%   BMI 23.88 kg/m²     Body mass index is 23.88 kg/m².    Review of Systems   Constitutional: Negative.    HENT: Negative.     Eyes: Negative.    Respiratory: Negative.     Cardiovascular: Negative.    Gastrointestinal: Negative.    Endocrine: Negative for polydipsia, polyphagia and polyuria.   Genitourinary: Negative.    Musculoskeletal: Negative.    Skin: Negative.    Neurological: Negative.    Psychiatric/Behavioral: Negative.         Past History:  Medical History: has a past medical history of Carpal tunnel syndrome (1995), Diabetes mellitus (2007), Elevated blood pressure reading, Emphysema, unspecified, Glaucoma, Hypertension (1997), and Pregnancy.   Surgical History: has a past surgical history that " includes Carpal tunnel release (); Tubal ligation; Tonsillectomy; Hysterectomy; Cosmetic surgery;  section; Breast surgery; and Inguinal hernia repair.   Family History: family history includes Cancer in an other family member; Coronary artery disease in her mother and another family member; Depression in her sister and another family member; Diabetes in her brother; Hypertension in her brother and mother; Osteoarthritis in her mother; Peripheral vascular disease in her mother.   Social History: reports that she has been smoking cigarettes. She has a 35 pack-year smoking history. She has never used smokeless tobacco. She reports current alcohol use. She reports that she does not use drugs.    PHQ-2 Depression Screening  Little interest or pleasure in doing things? Not at all   Feeling down, depressed, or hopeless? Not at all   PHQ-2 Total Score 0       PHQ-9 Depression Screening  Little interest or pleasure in doing things? Not at all   Feeling down, depressed, or hopeless? Not at all   PHQ-2 Total Score 0   Trouble falling or staying asleep, or sleeping too much?     Feeling tired or having little energy?     Poor appetite or overeating?     Feeling bad about yourself - or that you are a failure or have let yourself or your family down?     Trouble concentrating on things, such as reading the newspaper or watching television?     Moving or speaking so slowly that other people could have noticed? Or the opposite - being so fidgety or restless that you have been moving around a lot more than usual?     Thoughts that you would be better off dead, or of hurting yourself in some way?     PHQ-9 Total Score     If you checked off any problems, how difficult have these problems made it for you to do your work, take care of things at home, or get along with other people?          PHQ-9 Total Score:        Patient screened positive for depression based on a PHQ-9 score of  on . Follow-up recommendations include:           Current Outpatient Medications:     albuterol sulfate  (90 Base) MCG/ACT inhaler, Inhale 2 puffs Every 4 (Four) Hours As Needed for Wheezing., Disp: 18 g, Rfl: 0    amLODIPine (NORVASC) 5 MG tablet, Take 1 tablet by mouth Daily., Disp: 90 tablet, Rfl: 3    aspirin 81 MG EC tablet, Take 1 tablet by mouth Daily., Disp: 90 tablet, Rfl: 3    atorvastatin (LIPITOR) 20 MG tablet, Take 1 tablet by mouth Daily., Disp: 90 tablet, Rfl: 3    candesartan (ATACAND) 32 MG tablet, Take 1 tablet by mouth Daily., Disp: 90 tablet, Rfl: 3    Continuous Blood Gluc Sensor (Dexcom G6 Sensor), CHANGE SENSOR EVERY 10 DAYS, Disp: 3 each, Rfl: 1    Continuous Glucose Sensor (Dexcom G6 Sensor), Check glucose 3-4 times daily and PRN; E11.9, Disp: 3 each, Rfl: 3    Dupilumab (Dupixent) 300 MG/2ML solution prefilled syringe, Inject 2 mL under the skin into the appropriate area as directed., Disp: , Rfl:     empagliflozin (Jardiance) 10 MG tablet tablet, Take 1 tablet by mouth Daily., Disp: 90 tablet, Rfl: 3    furosemide (LASIX) 20 MG tablet, Take 1 tablet by mouth Daily. Take 1 pill PRN daily, Disp: 30 tablet, Rfl: 3    Insulin Glargine (Lantus SoloStar) 100 UNIT/ML injection pen, Inject 60 units daily subcutaneously as per insulin protocol every morning, Disp: 45 mL, Rfl: 2    ketoconazole (NIZORAL) 2 % cream, APPLY TO AFFECTED AREA 3 TIMES A DAY UNTIL FINISHED, Disp: , Rfl:     metFORMIN (GLUCOPHAGE) 1000 MG tablet, Take 1 tablet by mouth 2 (Two) Times a Day With Meals., Disp: 180 tablet, Rfl: 2    metoprolol succinate XL (TOPROL-XL) 50 MG 24 hr tablet, Take 1 tablet by mouth Daily., Disp: 90 tablet, Rfl: 3    Rocklatan 0.02-0.005 % solution, , Disp: , Rfl:     Trelegy Ellipta 200-62.5-25 MCG/ACT inhaler, INHALE 1 PUFF INTO THE LUNGS ONCE DAILY. WASH MOUTH OUT AFTER USING., Disp: , Rfl:     zolpidem (AMBIEN) 10 MG tablet, TAKE 1 TABLET BY MOUTH EVERYDAY AT BEDTIME, Disp: 30 tablet, Rfl: 2    Continuous Glucose Transmitter  (Dexcom G6 Transmitter) misc, 1 each by Other route Daily., Disp: 1 each, Rfl: 2   (Not in a hospital admission)     Allergies: Patient has no known allergies.    Physical Exam  Constitutional:       Appearance: Normal appearance.   HENT:      Head: Normocephalic.      Right Ear: Tympanic membrane, ear canal and external ear normal.      Left Ear: Tympanic membrane, ear canal and external ear normal.      Nose: Nose normal.      Mouth/Throat:      Mouth: Mucous membranes are moist.      Pharynx: Oropharynx is clear.   Eyes:      Extraocular Movements: Extraocular movements intact.      Conjunctiva/sclera: Conjunctivae normal.      Pupils: Pupils are equal, round, and reactive to light.   Cardiovascular:      Rate and Rhythm: Normal rate and regular rhythm.      Heart sounds: Normal heart sounds.   Pulmonary:      Effort: Pulmonary effort is normal.      Breath sounds: Normal breath sounds.   Abdominal:      General: Abdomen is flat. Bowel sounds are normal.      Palpations: Abdomen is soft.   Genitourinary:     Comments: Denied   Musculoskeletal:         General: Normal range of motion.      Cervical back: Normal range of motion.   Skin:     General: Skin is warm.   Neurological:      General: No focal deficit present.      Mental Status: She is alert and oriented to person, place, and time. Mental status is at baseline.   Psychiatric:         Mood and Affect: Mood normal.         Behavior: Behavior normal.         Thought Content: Thought content normal.         Judgment: Judgment normal.        Physical Exam      Result Review :          Results               Assessment and Plan    Diagnoses and all orders for this visit:    1. Annual physical exam (Primary)  Assessment & Plan:  Labs drawn.  UA completed.  Micro completed.  Goal blood pressure less than 140/90.  Let me or cardiology know if gets to or above that.  PHQ-9 completed and discussed.  No thoughts of suicide or hurting herself or anyone else.  Proper  diet and exercise plan discussed and encouraged.  Check feet daily.  She is due a mammogram.  She will discuss Pap smears with gynecology.  Colonoscopy up-to-date.  Continue annual dental and eye exams.  She is due a low-dose lung CT scan and states she will discuss that further with her pulmonologist.  Continue to follow-up with cardiology and pulmonology.  Smoking cessation discussed and encouraged.  She denies all immunizations and understands the risks.  UDS completed and on file.  She will let me know when she needs refills of Ambien.  Risk of meds discussed and understood.  Education provided.  Pneumonia 20 vaccine given today in clinic.  Vaccine information sheet given.  Risk discussed and understood.  Patient tolerated well.  States she will consider tetanus COVID shingles vaccines at a later time at her pharmacy.  Return to clinic or ED with any issues or concerns.    Orders:  -     CBC & Differential  -     Comprehensive Metabolic Panel  -     TSH Rfx On Abnormal To Free T4  -     POC Urinalysis Dipstick, Automated; Future  -     Pneumococcal Conjugate Vaccine 20-Valent (PCV20); Future  -     Pneumococcal Conjugate Vaccine 20-Valent (PCV20)  -     POC Urinalysis Dipstick, Automated    2. Hypercholesterolemia  -     Lipid Panel    3. Coronary artery disease involving native coronary artery of native heart without angina pectoris  Assessment & Plan:  Continue to follow-up with cardiology      4. Benign essential HTN  Assessment & Plan:  Continue to follow-up with cardiology      5. Type 2 diabetes mellitus with hyperglycemia, with long-term current use of insulin  -     Hemoglobin A1c  -     POC Microalbumin; Future  -     Insulin Glargine (Lantus SoloStar) 100 UNIT/ML injection pen; Inject 60 units daily subcutaneously as per insulin protocol every morning  Dispense: 45 mL; Refill: 2  -     metFORMIN (GLUCOPHAGE) 1000 MG tablet; Take 1 tablet by mouth 2 (Two) Times a Day With Meals.  Dispense: 180 tablet;  Refill: 2  -     POC Microalbumin    6. Glaucoma, unspecified glaucoma type, unspecified laterality  Assessment & Plan:  Continue to follow-up with eye specialist      7. Screening for colon cancer    8. Screening for cervical cancer  Assessment & Plan:  Denies Pap smears due to having total hysterectomy      9. Encounter for long-term (current) use of medications  -     POC Urine Drug Screen, Triage; Future  -     POC Urine Drug Screen, Triage    10. 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 Primary insomnia    13. Tobacco use    14. Screening for lung cancer  Assessment & Plan:  Denies low-dose lung CT scan      15. Encounter for screening mammogram for malignant neoplasm of breast  Assessment & Plan:  Patient denies mammogram and understands the risks.        Assessment & Plan              BMI is within normal parameters. No other follow-up for BMI required.       Follow Up   Return in about 6 months (around 4/30/2025).  Patient was given instructions and counseling regarding her condition or for health maintenance advice. Please see specific information pulled into the AVS if appropriate.     Patient or patient representative verbalized consent for the use of Ambient Listening during the visit with  RYLEY Long for chart documentation. 10/30/2024  14:51 EDT    RYLEY Long

## 2024-10-31 LAB
ALBUMIN SERPL-MCNC: 4.7 G/DL (ref 3.9–4.9)
ALP SERPL-CCNC: 124 IU/L (ref 44–121)
ALT SERPL-CCNC: 16 IU/L (ref 0–32)
AST SERPL-CCNC: 20 IU/L (ref 0–40)
BASOPHILS # BLD AUTO: 0.1 X10E3/UL (ref 0–0.2)
BASOPHILS NFR BLD AUTO: 0 %
BILIRUB SERPL-MCNC: <0.2 MG/DL (ref 0–1.2)
BUN SERPL-MCNC: 13 MG/DL (ref 8–27)
BUN/CREAT SERPL: 17 (ref 12–28)
CALCIUM SERPL-MCNC: 10.2 MG/DL (ref 8.7–10.3)
CHLORIDE SERPL-SCNC: 97 MMOL/L (ref 96–106)
CHOLEST SERPL-MCNC: 144 MG/DL (ref 100–199)
CO2 SERPL-SCNC: 26 MMOL/L (ref 20–29)
CREAT SERPL-MCNC: 0.77 MG/DL (ref 0.57–1)
EGFRCR SERPLBLD CKD-EPI 2021: 86 ML/MIN/1.73
EOSINOPHIL # BLD AUTO: 0.2 X10E3/UL (ref 0–0.4)
EOSINOPHIL NFR BLD AUTO: 1 %
ERYTHROCYTE [DISTWIDTH] IN BLOOD BY AUTOMATED COUNT: 11.7 % (ref 11.7–15.4)
GLOBULIN SER CALC-MCNC: 2.5 G/DL (ref 1.5–4.5)
GLUCOSE SERPL-MCNC: 57 MG/DL (ref 70–99)
HBA1C MFR BLD: 7.5 % (ref 4.8–5.6)
HCT VFR BLD AUTO: 47.5 % (ref 34–46.6)
HDLC SERPL-MCNC: 61 MG/DL
HGB BLD-MCNC: 15.8 G/DL (ref 11.1–15.9)
IMM GRANULOCYTES # BLD AUTO: 0 X10E3/UL (ref 0–0.1)
IMM GRANULOCYTES NFR BLD AUTO: 0 %
LDLC SERPL CALC-MCNC: 72 MG/DL (ref 0–99)
LYMPHOCYTES # BLD AUTO: 3.4 X10E3/UL (ref 0.7–3.1)
LYMPHOCYTES NFR BLD AUTO: 29 %
MCH RBC QN AUTO: 32.5 PG (ref 26.6–33)
MCHC RBC AUTO-ENTMCNC: 33.3 G/DL (ref 31.5–35.7)
MCV RBC AUTO: 98 FL (ref 79–97)
MONOCYTES # BLD AUTO: 0.9 X10E3/UL (ref 0.1–0.9)
MONOCYTES NFR BLD AUTO: 7 %
NEUTROPHILS # BLD AUTO: 7.3 X10E3/UL (ref 1.4–7)
NEUTROPHILS NFR BLD AUTO: 63 %
PLATELET # BLD AUTO: 341 X10E3/UL (ref 150–450)
POTASSIUM SERPL-SCNC: 4.7 MMOL/L (ref 3.5–5.2)
PROT SERPL-MCNC: 7.2 G/DL (ref 6–8.5)
RBC # BLD AUTO: 4.86 X10E6/UL (ref 3.77–5.28)
SODIUM SERPL-SCNC: 136 MMOL/L (ref 134–144)
TRIGL SERPL-MCNC: 53 MG/DL (ref 0–149)
TSH SERPL DL<=0.005 MIU/L-ACNC: 1.11 UIU/ML (ref 0.45–4.5)
VLDLC SERPL CALC-MCNC: 11 MG/DL (ref 5–40)
WBC # BLD AUTO: 11.8 X10E3/UL (ref 3.4–10.8)

## 2024-11-05 ENCOUNTER — TELEPHONE (OUTPATIENT)
Dept: FAMILY MEDICINE CLINIC | Facility: CLINIC | Age: 64
End: 2024-11-05
Payer: COMMERCIAL

## 2024-11-05 NOTE — TELEPHONE ENCOUNTER
HUB TO RELAY    Please let patient know from labs her white blood cell count has remained mildly elevated.  Her hematocrit which pertains to the red blood cells is also mildly elevated.  Neutrophils and lymphocytes which are specific types of white blood cells are elevated as well.  I cannot remember, has she seen a hematologist about this?  If not, it may not be a bad idea so let me know if she would like that referral.     Diabetes stable with A1c at 7.5.  Ideally would like it closer to 7 or less. continue current medications but really stress healthy diabetic diet and daily exercise.  Her actual blood sugar that particular time was low though so make sure she is eating small frequent meals to prevent blood sugars from getting too low especially with her being on insulin.

## 2024-11-07 NOTE — TELEPHONE ENCOUNTER
Name: Stephani Ye    Relationship: Self    Best Callback Number: [phone]    HUB PROVIDED THE RELAY MESSAGE FROM THE OFFICE   PATIENT VOICED UNDERSTANDING AND HAS NO FURTHER QUESTIONS AT THIS TIME    ADDITIONAL INFORMATION: PT STATED THAT SHE HAS ALREADY SEEN A HEMATOLOGIST

## 2024-11-13 RX ORDER — EMPAGLIFLOZIN 10 MG/1
10 TABLET, FILM COATED ORAL DAILY
Qty: 30 TABLET | Refills: 2 | Status: SHIPPED | OUTPATIENT
Start: 2024-11-13

## 2024-12-22 DIAGNOSIS — F51.01 PRIMARY INSOMNIA: ICD-10-CM

## 2024-12-26 RX ORDER — ZOLPIDEM TARTRATE 10 MG/1
10 TABLET ORAL
Qty: 30 TABLET | Refills: 2 | Status: SHIPPED | OUTPATIENT
Start: 2024-12-26

## 2025-01-22 RX ORDER — PROCHLORPERAZINE 25 MG/1
SUPPOSITORY RECTAL
Qty: 3 EACH | Refills: 3 | Status: SHIPPED | OUTPATIENT
Start: 2025-01-22

## 2025-02-12 DIAGNOSIS — E11.65 TYPE 2 DIABETES MELLITUS WITH HYPERGLYCEMIA, WITH LONG-TERM CURRENT USE OF INSULIN: ICD-10-CM

## 2025-02-12 DIAGNOSIS — Z79.4 TYPE 2 DIABETES MELLITUS WITH HYPERGLYCEMIA, WITH LONG-TERM CURRENT USE OF INSULIN: ICD-10-CM

## 2025-02-12 RX ORDER — INSULIN GLARGINE 100 [IU]/ML
INJECTION, SOLUTION SUBCUTANEOUS
Qty: 45 ML | Refills: 1 | Status: SHIPPED | OUTPATIENT
Start: 2025-02-12

## 2025-02-19 RX ORDER — FUROSEMIDE 20 MG/1
20 TABLET ORAL DAILY
Qty: 90 TABLET | Refills: 0 | Status: SHIPPED | OUTPATIENT
Start: 2025-02-19

## 2025-04-25 DIAGNOSIS — F51.01 PRIMARY INSOMNIA: ICD-10-CM

## 2025-04-28 RX ORDER — ZOLPIDEM TARTRATE 10 MG/1
10 TABLET ORAL
Qty: 30 TABLET | Refills: 2 | Status: SHIPPED | OUTPATIENT
Start: 2025-04-28

## 2025-05-06 ENCOUNTER — OFFICE VISIT (OUTPATIENT)
Dept: CARDIOLOGY | Facility: CLINIC | Age: 65
End: 2025-05-06
Payer: COMMERCIAL

## 2025-05-06 ENCOUNTER — OFFICE VISIT (OUTPATIENT)
Dept: FAMILY MEDICINE CLINIC | Facility: CLINIC | Age: 65
End: 2025-05-06
Payer: COMMERCIAL

## 2025-05-06 VITALS
OXYGEN SATURATION: 91 % | HEIGHT: 64 IN | BODY MASS INDEX: 22.88 KG/M2 | WEIGHT: 134 LBS | TEMPERATURE: 97 F | RESPIRATION RATE: 18 BRPM | SYSTOLIC BLOOD PRESSURE: 114 MMHG | HEART RATE: 90 BPM | DIASTOLIC BLOOD PRESSURE: 80 MMHG

## 2025-05-06 VITALS
SYSTOLIC BLOOD PRESSURE: 110 MMHG | HEART RATE: 85 BPM | OXYGEN SATURATION: 95 % | WEIGHT: 132.56 LBS | BODY MASS INDEX: 22.63 KG/M2 | DIASTOLIC BLOOD PRESSURE: 80 MMHG | HEIGHT: 64 IN

## 2025-05-06 DIAGNOSIS — Z79.4 TYPE 2 DIABETES MELLITUS WITH HYPERGLYCEMIA, WITH LONG-TERM CURRENT USE OF INSULIN: Primary | ICD-10-CM

## 2025-05-06 DIAGNOSIS — Z72.0 TOBACCO USE: ICD-10-CM

## 2025-05-06 DIAGNOSIS — Z12.4 SCREENING FOR CERVICAL CANCER: ICD-10-CM

## 2025-05-06 DIAGNOSIS — J43.9 BULLA OF LUNG: ICD-10-CM

## 2025-05-06 DIAGNOSIS — E11.65 TYPE 2 DIABETES MELLITUS WITH HYPERGLYCEMIA, WITH LONG-TERM CURRENT USE OF INSULIN: Primary | ICD-10-CM

## 2025-05-06 DIAGNOSIS — I25.10 CORONARY ARTERY DISEASE INVOLVING NATIVE CORONARY ARTERY OF NATIVE HEART WITHOUT ANGINA PECTORIS: ICD-10-CM

## 2025-05-06 DIAGNOSIS — Z79.899 ENCOUNTER FOR LONG-TERM (CURRENT) USE OF MEDICATIONS: ICD-10-CM

## 2025-05-06 DIAGNOSIS — I10 BENIGN ESSENTIAL HTN: Primary | ICD-10-CM

## 2025-05-06 DIAGNOSIS — Z13.820 SCREENING FOR OSTEOPOROSIS: ICD-10-CM

## 2025-05-06 DIAGNOSIS — J43.1 PANLOBULAR EMPHYSEMA: ICD-10-CM

## 2025-05-06 DIAGNOSIS — E78.00 HYPERCHOLESTEROLEMIA: ICD-10-CM

## 2025-05-06 DIAGNOSIS — J44.9 CHRONIC OBSTRUCTIVE PULMONARY DISEASE, UNSPECIFIED COPD TYPE: ICD-10-CM

## 2025-05-06 DIAGNOSIS — E11.65 TYPE 2 DIABETES MELLITUS WITH HYPERGLYCEMIA, WITH LONG-TERM CURRENT USE OF INSULIN: ICD-10-CM

## 2025-05-06 DIAGNOSIS — Z12.31 ENCOUNTER FOR SCREENING MAMMOGRAM FOR MALIGNANT NEOPLASM OF BREAST: ICD-10-CM

## 2025-05-06 DIAGNOSIS — Z12.2 SCREENING FOR LUNG CANCER: ICD-10-CM

## 2025-05-06 DIAGNOSIS — Z79.4 TYPE 2 DIABETES MELLITUS WITH HYPERGLYCEMIA, WITH LONG-TERM CURRENT USE OF INSULIN: ICD-10-CM

## 2025-05-06 DIAGNOSIS — Z12.11 SCREENING FOR COLON CANCER: ICD-10-CM

## 2025-05-06 DIAGNOSIS — F51.01 PRIMARY INSOMNIA: ICD-10-CM

## 2025-05-06 LAB
POC AMPHETAMINES: NEGATIVE
POC BARBITURATES: NEGATIVE
POC BENZODIAZEPHINES: NEGATIVE
POC BUPRENORPHINE: NEGATIVE
POC COCAINE: NEGATIVE
POC METHADONE: NEGATIVE
POC METHAMPHETAMINE SCREEN URINE: NEGATIVE
POC OPIATES: NEGATIVE
POC OXYCODONE: NEGATIVE
POC PHENCYCLIDINE: NEGATIVE
POC PROPOXYPHENE: NEGATIVE
POC THC: NEGATIVE
POC TRICYCLIC ANTIDEPRESSANTS: NEGATIVE

## 2025-05-06 PROCEDURE — 93000 ELECTROCARDIOGRAM COMPLETE: CPT | Performed by: INTERNAL MEDICINE

## 2025-05-06 PROCEDURE — 99214 OFFICE O/P EST MOD 30 MIN: CPT | Performed by: INTERNAL MEDICINE

## 2025-05-06 RX ORDER — TRAVOPROST OPHTHALMIC SOLUTION 0.04 MG/ML
SOLUTION OPHTHALMIC
COMMUNITY
Start: 2025-04-22

## 2025-05-06 RX ORDER — METOPROLOL SUCCINATE 50 MG/1
50 TABLET, EXTENDED RELEASE ORAL DAILY
Qty: 90 TABLET | Refills: 3 | Status: SHIPPED | OUTPATIENT
Start: 2025-05-06

## 2025-05-06 RX ORDER — INSULIN GLARGINE 100 [IU]/ML
INJECTION, SOLUTION SUBCUTANEOUS
Qty: 45 ML | Refills: 1 | Status: SHIPPED | OUTPATIENT
Start: 2025-05-06

## 2025-05-06 RX ORDER — ENSIFENTRINE 3 MG/2.5ML
3 SUSPENSION RESPIRATORY (INHALATION)
COMMUNITY
Start: 2025-03-26

## 2025-05-06 RX ORDER — AZELASTINE HYDROCHLORIDE 137 UG/1
SPRAY, METERED NASAL
COMMUNITY

## 2025-05-06 RX ORDER — FUROSEMIDE 20 MG/1
20 TABLET ORAL DAILY
Qty: 90 TABLET | Refills: 3 | Status: SHIPPED | OUTPATIENT
Start: 2025-05-06

## 2025-05-06 NOTE — PROGRESS NOTES
Chief Complaint  Diabetes, Hyperlipidemia, and Hypertension    Subjective          Stephani Ephraim presents to [facility]  Diabetes  Associated symptoms:     no chest pain, no fatigue, no polydipsia, no polyphagia, no polyuria and no weakness    Hyperlipidemia  Pertinent negatives include no chest pain or myalgias.   Hypertension  Associated symptoms: no chest pain and no neck pain      History of Present Illness  The patient is a 65-year-old female here for a follow-up of diabetes and insomnia.    She is currently on Lantus 60 units daily as well as metformin and doing well. She reports that her feet are in good condition with no cuts or sores. She uses a Dexcom device to monitor her blood glucose levels, which typically remain below 150. She has an appointment scheduled with Dr. Mix her cardiologist this afternoon. She has recently started Jardiance under his supervision.    She has been diagnosed with glaucoma and has received two intraocular injections, which have significantly improved her condition. Sees Dr. Sylvester. She has also been diagnosed with cataracts, which have impacted her lifestyle to the extent that she avoids driving at night.    She continues to follow up with her specialists, including cardiology, pulmonary, and ophthalmology. She does not report any dizziness, headache, chest pain, chest pressure, shortness of breath, trouble breathing, or urinary or bowel issues. She continues to smoke and keeps up with lung scans with her pulmonologist, Dr. Ye, and states she one lung CT scan coming up. She is due for a mammogram and DEXA scan. Denies mammogram for the time being and understands the risks. She tries to watch her diet and stays active. She had a colonoscopy at age 60, which showed no significant findings per pt. Report. She denies repeat colonoscopy/cologuard and understands the risks.    She has been prescribed Ambien for her insomnia.    Her current  "pulmonologist is Dr. Ye in [place].  States her contract in [place] will be ending in a couple of months so is requesting we go ahead and place a referral for a new pulmonologist to establish care with been back to the area.    SOCIAL HISTORY  She continues to smoke, consuming less than a pack on a normal workday but increasing usage during long drives.    FAMILY HISTORY  Her mother had diabetes and later experienced a stroke.    Patient Care Team:  Tyler Edward APRN as PCP - General (Nurse Practitioner)  Tyler Edward APRN (Nurse Practitioner)  Hiro Mix MD as Consulting Physician (Cardiology)  Melissa Stephen MD as Consulting Physician (Ophthalmology)     Objective   Vital Signs:   /80   Pulse 85   Ht 162.6 cm (64\")   Wt 60.1 kg (132 lb 9 oz)   SpO2 95%   BMI 22.75 kg/m²     Body mass index is 22.75 kg/m².    Review of Systems   Constitutional:  Negative for chills, diaphoresis, fatigue and fever.   HENT:  Negative for congestion, sore throat and swollen glands.    Cardiovascular:  Negative for chest pain.   Gastrointestinal:  Negative for abdominal pain, nausea and vomiting.   Endocrine: Negative for polydipsia, polyphagia and polyuria.   Genitourinary:  Negative for dysuria.   Musculoskeletal:  Negative for myalgias and neck pain.   Skin: Negative.  Negative for rash.   Neurological:  Negative for weakness and numbness.   Psychiatric/Behavioral: Negative.         Past History:  Medical History: has a past medical history of Carpal tunnel syndrome (), Diabetes mellitus (), Elevated blood pressure reading, Emphysema, unspecified, Glaucoma, Hypertension (), and Pregnancy.   Surgical History: has a past surgical history that includes Carpal tunnel release (); Tubal ligation; Tonsillectomy; Hysterectomy; Cosmetic surgery;  section; Breast surgery; and Inguinal hernia repair.   Family History: family history includes Cancer in an other family " member; Coronary artery disease in her mother and another family member; Depression in her sister and another family member; Diabetes in her brother; Hypertension in her brother and mother; Osteoarthritis in her mother; Peripheral vascular disease in her mother.   Social History: reports that she has been smoking cigarettes. She has a 35 pack-year smoking history. She has never used smokeless tobacco. She reports current alcohol use. She reports that she does not use drugs.    PHQ-2 Depression Screening  Little interest or pleasure in doing things? Not at all   Feeling down, depressed, or hopeless? Not at all   PHQ-2 Total Score 0       PHQ-9 Depression Screening  Little interest or pleasure in doing things? Not at all   Feeling down, depressed, or hopeless? Not at all   PHQ-2 Total Score 0   Trouble falling or staying asleep, or sleeping too much?     Feeling tired or having little energy?     Poor appetite or overeating?     Feeling bad about yourself - or that you are a failure or have let yourself or your family down?     Trouble concentrating on things, such as reading the newspaper or watching television?     Moving or speaking so slowly that other people could have noticed? Or the opposite - being so fidgety or restless that you have been moving around a lot more than usual?     Thoughts that you would be better off dead, or of hurting yourself in some way?     PHQ-9 Total Score     If you checked off any problems, how difficult have these problems made it for you to do your work, take care of things at home, or get along with other people? Not difficult at all        PHQ-9 Total Score:        Patient screened positive for depression based on a PHQ-9 score of  on . Follow-up recommendations include:          Current Outpatient Medications:     albuterol sulfate  (90 Base) MCG/ACT inhaler, Inhale 2 puffs Every 4 (Four) Hours As Needed for Wheezing., Disp: 18 g, Rfl: 0    amLODIPine (NORVASC) 5 MG  tablet, Take 1 tablet by mouth Daily., Disp: 90 tablet, Rfl: 3    aspirin 81 MG EC tablet, Take 1 tablet by mouth Daily., Disp: 90 tablet, Rfl: 3    atorvastatin (LIPITOR) 20 MG tablet, Take 1 tablet by mouth Daily., Disp: 90 tablet, Rfl: 3    Azelastine HCl 137 MCG/SPRAY solution, 1-2 SPRAYS BY NASAL ROUTE TWICE DAILY., Disp: , Rfl:     candesartan (ATACAND) 32 MG tablet, Take 1 tablet by mouth Daily., Disp: 90 tablet, Rfl: 3    Continuous Blood Gluc Sensor (Dexcom G6 Sensor), CHANGE SENSOR EVERY 10 DAYS, Disp: 3 each, Rfl: 1    Continuous Glucose Sensor (Dexcom G6 Sensor), Check glucose 3-4 times daily and PRN; E11.9, Disp: 3 each, Rfl: 3    Continuous Glucose Sensor (Dexcom G6 Sensor), CHECK GLUCOSE 3-4 TIMES DAILY AND AS NEEDED. CHANGE EVERY 10 DAYS, Disp: 3 each, Rfl: 3    Continuous Glucose Transmitter (Dexcom G6 Transmitter) misc, 1 each by Other route Daily., Disp: 1 each, Rfl: 2    Dupilumab (Dupixent) 300 MG/2ML solution prefilled syringe, Inject 2 mL under the skin into the appropriate area as directed., Disp: , Rfl:     empagliflozin (Jardiance) 10 MG tablet tablet, Take 1 tablet by mouth Daily., Disp: 30 tablet, Rfl: 2    Ensifentrine (Ohtuvayre) 3 MG/2.5ML nebulizer solution, Inhale 2.5 mL., Disp: , Rfl:     furosemide (LASIX) 20 MG tablet, Take 1 tablet by mouth Daily. Take 1 pill PRN daily, Disp: 90 tablet, Rfl: 0    Insulin Glargine (Lantus SoloStar) 100 UNIT/ML injection pen, Inject 60 units daily subcutaneously as per insulin protocol every morning, Disp: 45 mL, Rfl: 1    metFORMIN (GLUCOPHAGE) 1000 MG tablet, Take 1 tablet by mouth 2 (Two) Times a Day With Meals., Disp: 180 tablet, Rfl: 2    metoprolol succinate XL (TOPROL-XL) 50 MG 24 hr tablet, Take 1 tablet by mouth Daily., Disp: 90 tablet, Rfl: 3    travoprost, BAK free, (TRAVATAN) 0.004 % solution ophthalmic solution, LOCATION: BOTH EYES. ONE DROP AS DIRECTED AT BEDTIME, Disp: , Rfl:     Misty Ellipta 200-62.5-25 MCG/ACT inhaler, INHALE 1  PUFF INTO THE LUNGS ONCE DAILY. WASH MOUTH OUT AFTER USING., Disp: , Rfl:     zolpidem (AMBIEN) 10 MG tablet, TAKE 1 TABLET BY MOUTH EVERYDAY AT BEDTIME, Disp: 30 tablet, Rfl: 2   (Not in a hospital admission)     Allergies: Patient has no known allergies.    Physical Exam  Constitutional:       Appearance: Normal appearance.   Eyes:      Conjunctiva/sclera: Conjunctivae normal.      Pupils: Pupils are equal, round, and reactive to light.   Cardiovascular:      Rate and Rhythm: Normal rate and regular rhythm.      Heart sounds: Normal heart sounds.   Pulmonary:      Effort: Pulmonary effort is normal.      Breath sounds: Normal breath sounds.   Neurological:      General: No focal deficit present.      Mental Status: She is alert and oriented to person, place, and time. Mental status is at baseline.   Psychiatric:         Mood and Affect: Mood normal.         Behavior: Behavior normal.         Thought Content: Thought content normal.         Judgment: Judgment normal.        Physical Exam  Ears: External ear canals and tympanic membranes intact  Mouth/Throat: Mucous membranes moist, no erythema, no exudate  Respiratory: Clear to auscultation, no wheezing, rales or rhonchi  Cardiovascular: Regular rate and rhythm, no murmurs, rubs, or gallops  Extremities: Feet are in good condition with no infections    Result Review :          Results               Assessment and Plan    Diagnoses and all orders for this visit:    1. Type 2 diabetes mellitus with hyperglycemia, with long-term current use of insulin (Primary)  -     Hemoglobin A1c  -     Microalbumin / Creatinine Urine Ratio - Urine, Clean Catch  -     Insulin Glargine (Lantus SoloStar) 100 UNIT/ML injection pen; Inject 60 units daily subcutaneously as per insulin protocol every morning  Dispense: 45 mL; Refill: 1  -     metFORMIN (GLUCOPHAGE) 1000 MG tablet; Take 1 tablet by mouth 2 (Two) Times a Day With Meals.  Dispense: 180 tablet; Refill: 2    2. Screening for  colon cancer    3. Screening for cervical cancer    4. Encounter for screening mammogram for malignant neoplasm of breast    5. Encounter for long-term (current) use of medications  -     CBC & Differential  -     Comprehensive Metabolic Panel  -     POC Urine Drug Screen, Triage; Future    6. Primary insomnia    7. Tobacco use    8. Chronic obstructive pulmonary disease, unspecified COPD type  -     Ambulatory Referral to Pulmonology    9. Bulla of lung  -     Ambulatory Referral to Pulmonology    10. Screening for lung cancer  -     Ambulatory Referral to Pulmonology    11. Screening for osteoporosis  -     DEXA Bone Density Axial      Assessment & Plan  1. Diabetes Mellitus.  - Her diabetes is well-managed with Lantus 60 units daily and metformin.  - She uses a Dexcom to monitor her blood glucose levels, which are generally below 150.  - An A1c test will be conducted today to monitor her blood sugar control.  - She will continue her current medication regimen.  Check feet daily.  Continue routine eye exams.  Proper diet and exercise plan discussed and encouraged.  Risk of meds discussed and understood.  Education provided.  Return to clinic or ED with any issues or concerns.    2. Insomnia.  - She has been prescribed Ambien and picked up her prescription yesterday.  - A urine drug screen will be obtained today to ensure compliance with her sleep medication regimen.  - Prescription Drug Monitoring Program was reviewed.  - She reports no issues with the medication.    3. Chronic Obstructive Pulmonary Disease (COPD).  - She continues to smoke less than a pack a day but increases smoking during long drives.  - She follows up with her pulmonologist, Dr. Ye, and finds Dupixent beneficial.  - A referral to a local pulmonologist will be initiated since she will be moving from [place].  - She will check her next appointment with Dr. Ye before leaving [place].  Smoking cessation discussed and encouraged.    4.  Glaucoma.  - She has been receiving intraocular injections for early-onset macular degeneration and finds them effective.  - She will continue follow-up with her ophthalmologist and is switching care to  for glaucoma management.  - She reports cataracts impacting her lifestyle, particularly driving at night.  States she will discuss this further with her eye specialist.  - She will discuss the timing of cataract removal with her ophthalmologist.    5. Health Maintenance.  - She is due for a mammogram and a DEXA scan.  - Blood work will be conducted today to assess liver function, kidney function, and complete blood count (CBC).  - She denies mammogram for the time and understands the risks of not doing that.  Will place order for DEXA scan.  She denies colonoscopy/Cologuard and Pap smears.  - No interest in colonoscopy; last one was done 5 years ago with no notable findings.            BMI is within normal parameters. No other follow-up for BMI required.       Follow Up   Return in about 6 months (around 11/6/2025) for Annual physical.  Patient was given instructions and counseling regarding her condition or for health maintenance advice. Please see specific information pulled into the AVS if appropriate.     Patient or patient representative verbalized consent for the use of Ambient Listening during the visit with  RYLEY Long for chart documentation. 5/6/2025  10:56 EDT    RYLEY Long

## 2025-05-06 NOTE — PROGRESS NOTES
[facility]  [facility]  [address] [place] [address]  Dept: [phone]  Dept Fax: [phone]    Stephani Ye  1960    Follow Up Office Visit Note    History of Present Illness:  Stephani Ye is a 65 y.o. female who presents to the clinic for  Hypertension- The BP seems good on Candesartan 32 mg, Amlodipine 5 mg and also Toprol xl 50 mg, she takes lasix PRN 20 mg,  - denies any major complaints EKG sinus HR 82    The following portions of the patient's history were reviewed and updated as appropriate: allergies, current medications, past family history, past medical history, past social history, past surgical history, and problem list.    Medications:  albuterol sulfate HFA  amLODIPine  aspirin  atorvastatin  Azelastine HCl solution  candesartan  Dexcom G6 Sensor  Dexcom G6 Transmitter misc  Dupixent solution prefilled syringe  empagliflozin tablet  furosemide  Lantus SoloStar solution pen-injector  metFORMIN  metoprolol succinate XL  Ohtuvayre suspension  travoprost (UZMA free) solution  Trelegy Ellipta aerosol powder   zolpidem    Subjective  No Known Allergies     Past Medical History:   Diagnosis Date    Carpal tunnel syndrome     Diabetes mellitus     Elevated blood pressure reading     Emphysema, unspecified     Glaucoma     Hypertension     Pregnancy     ,,       Past Surgical History:   Procedure Laterality Date    BREAST SURGERY      CARPAL TUNNEL RELEASE       SECTION      COSMETIC SURGERY      HYSTERECTOMY      INGUINAL HERNIA REPAIR      TONSILLECTOMY      TUBAL ABDOMINAL LIGATION         Family History   Problem Relation Age of Onset    Hypertension Mother     Osteoarthritis Mother     Coronary artery disease Mother     Peripheral vascular disease Mother     Depression Sister     Diabetes Brother     Hypertension Brother     Coronary artery disease Other         PREMATURE    Depression Other     Cancer Other      "    Social History     Socioeconomic History    Marital status: Single   Tobacco Use    Smoking status: Heavy Smoker     Current packs/day: 1.00     Average packs/day: 1 pack/day for 35.0 years (35.0 ttl pk-yrs)     Types: Cigarettes    Smokeless tobacco: Never   Vaping Use    Vaping status: Never Used   Substance and Sexual Activity    Alcohol use: Yes     Comment: Rarely    Drug use: Never    Sexual activity: Not Currently     Partners: Male     Birth control/protection: Post-menopausal       Review of Systems   Constitutional: Negative.    HENT: Negative.     Respiratory: Negative.     Cardiovascular: Negative.    Endocrine: Negative.    Genitourinary: Negative.    Musculoskeletal: Negative.    Skin: Negative.    Allergic/Immunologic: Negative.    Neurological: Negative.    Hematological: Negative.    Psychiatric/Behavioral: Negative.         Cardiovascular Procedures    ECHO/MUGA:  STRESS TESTS:   CARDIAC CATH:   DEVICES:   HOLTER:   CT/MRI:   VASCULAR:   CARDIOTHORACIC:     Objective  Vitals:    05/06/25 1354   BP: 114/80   BP Location: Right arm   Patient Position: Lying   Cuff Size: Adult   Pulse: 90   Resp: 18   Temp: 97 °F (36.1 °C)   TempSrc: Infrared   SpO2: 91%   Weight: 60.8 kg (134 lb)   Height: 162.6 cm (64\")   PainSc: 0-No pain     Body mass index is 23 kg/m².     Physical Exam  Vitals reviewed.   Constitutional:       Appearance: Healthy appearance. Not in distress.   Neck:      Vascular: No JVR. JVD normal.   Pulmonary:      Effort: Pulmonary effort is normal.      Breath sounds: Normal breath sounds. No wheezing. No rhonchi. No rales.   Chest:      Chest wall: Not tender to palpatation.   Cardiovascular:      PMI at left midclavicular line. Normal rate. Regular rhythm. Normal S1. Normal S2.       Murmurs: There is no murmur.      No gallop.  No click. No rub.   Pulses:     Intact distal pulses.   Edema:     Peripheral edema absent.   Abdominal:      General: Bowel sounds are normal.      " Palpations: Abdomen is soft.      Tenderness: There is no abdominal tenderness.   Musculoskeletal: Normal range of motion.         General: No tenderness. Skin:     General: Skin is warm and dry.   Neurological:      General: No focal deficit present.      Mental Status: Alert and oriented to person, place and time.        Diagnostic Data    ECG 12 Lead    Date/Time: 5/6/2025 2:31 PM  Performed by: Hiro Mix MD    Authorized by: Hiro Mix MD  Comparison: compared with previous ECG from 10/30/2024  Similar to previous ECG  Rhythm: sinus rhythm  Rate: normal  BPM: 82  QRS axis: normal  Other findings: left ventricular hypertrophy    Clinical impression: normal ECG        Assessment and Plan  Diagnoses and all orders for this visit:    Benign essential HTN- BP is fine 125.60 will keep Candesartan 32mg, Amlodipine 5mg, and Toprol xl 50 mg  -     metoprolol succinate XL (TOPROL-XL) 50 MG 24 hr tablet; Take 1 tablet by mouth Daily.    Coronary artery disease involving native coronary artery of native heart without angina pectoris- No chest pain, has calcifications of the coronary arteries by CT but stress nuclear normal    Hypercholesterolemia- On Lipitor 20 mg,tolerates wlel    Tobacco use- still smoking     Type 2 diabetes mellitus with hyperglycemia, with long-term current use of insulin    Panlobular emphysema    Other orders  -     furosemide (LASIX) 20 MG tablet; Take 1 tablet by mouth Daily. Take 1 pill PRN daily  -     empagliflozin (Jardiance) 10 MG tablet tablet; Take 1 tablet by mouth Daily.         Return in about 1 year (around 5/6/2026) for Recheck with Dr. Mix.    Hiro Mix MD  05/06/2025

## 2025-05-07 ENCOUNTER — RESULTS FOLLOW-UP (OUTPATIENT)
Dept: FAMILY MEDICINE CLINIC | Facility: CLINIC | Age: 65
End: 2025-05-07

## 2025-05-07 LAB
ALBUMIN SERPL-MCNC: 4.4 G/DL (ref 3.9–4.9)
ALBUMIN/CREAT UR: 5 MG/G CREAT (ref 0–29)
ALP SERPL-CCNC: 107 IU/L (ref 44–121)
ALT SERPL-CCNC: 11 IU/L (ref 0–32)
AST SERPL-CCNC: 13 IU/L (ref 0–40)
BASOPHILS # BLD AUTO: 0.1 X10E3/UL (ref 0–0.2)
BASOPHILS NFR BLD AUTO: 1 %
BILIRUB SERPL-MCNC: 0.3 MG/DL (ref 0–1.2)
BUN SERPL-MCNC: 14 MG/DL (ref 8–27)
BUN/CREAT SERPL: 22 (ref 12–28)
CALCIUM SERPL-MCNC: 10.1 MG/DL (ref 8.7–10.3)
CHLORIDE SERPL-SCNC: 99 MMOL/L (ref 96–106)
CO2 SERPL-SCNC: 24 MMOL/L (ref 20–29)
CREAT SERPL-MCNC: 0.65 MG/DL (ref 0.57–1)
CREAT UR-MCNC: 59.4 MG/DL
EGFRCR SERPLBLD CKD-EPI 2021: 98 ML/MIN/1.73
EOSINOPHIL # BLD AUTO: 0.2 X10E3/UL (ref 0–0.4)
EOSINOPHIL NFR BLD AUTO: 2 %
ERYTHROCYTE [DISTWIDTH] IN BLOOD BY AUTOMATED COUNT: 12.2 % (ref 11.7–15.4)
GLOBULIN SER CALC-MCNC: 2.2 G/DL (ref 1.5–4.5)
GLUCOSE SERPL-MCNC: 114 MG/DL (ref 70–99)
HBA1C MFR BLD: 6.9 % (ref 4.8–5.6)
HCT VFR BLD AUTO: 45.9 % (ref 34–46.6)
HGB BLD-MCNC: 14.8 G/DL (ref 11.1–15.9)
IMM GRANULOCYTES # BLD AUTO: 0 X10E3/UL (ref 0–0.1)
IMM GRANULOCYTES NFR BLD AUTO: 0 %
LYMPHOCYTES # BLD AUTO: 2.9 X10E3/UL (ref 0.7–3.1)
LYMPHOCYTES NFR BLD AUTO: 25 %
MCH RBC QN AUTO: 31.5 PG (ref 26.6–33)
MCHC RBC AUTO-ENTMCNC: 32.2 G/DL (ref 31.5–35.7)
MCV RBC AUTO: 98 FL (ref 79–97)
MICROALBUMIN UR-MCNC: 3 UG/ML
MONOCYTES # BLD AUTO: 0.8 X10E3/UL (ref 0.1–0.9)
MONOCYTES NFR BLD AUTO: 7 %
NEUTROPHILS # BLD AUTO: 7.6 X10E3/UL (ref 1.4–7)
NEUTROPHILS NFR BLD AUTO: 65 %
PLATELET # BLD AUTO: 358 X10E3/UL (ref 150–450)
POTASSIUM SERPL-SCNC: 5 MMOL/L (ref 3.5–5.2)
PROT SERPL-MCNC: 6.6 G/DL (ref 6–8.5)
RBC # BLD AUTO: 4.7 X10E6/UL (ref 3.77–5.28)
SODIUM SERPL-SCNC: 138 MMOL/L (ref 134–144)
WBC # BLD AUTO: 11.6 X10E3/UL (ref 3.4–10.8)

## 2025-05-14 DIAGNOSIS — E11.65 TYPE 2 DIABETES MELLITUS WITH HYPERGLYCEMIA, WITH LONG-TERM CURRENT USE OF INSULIN: ICD-10-CM

## 2025-05-14 DIAGNOSIS — Z79.4 TYPE 2 DIABETES MELLITUS WITH HYPERGLYCEMIA, WITH LONG-TERM CURRENT USE OF INSULIN: ICD-10-CM

## 2025-05-14 RX ORDER — PROCHLORPERAZINE 25 MG/1
1 SUPPOSITORY RECTAL DAILY
Qty: 1 EACH | Refills: 2 | Status: SHIPPED | OUTPATIENT
Start: 2025-05-14

## 2025-06-05 ENCOUNTER — SPECIALTY PHARMACY (OUTPATIENT)
Dept: PULMONOLOGY | Facility: CLINIC | Age: 65
End: 2025-06-05
Payer: COMMERCIAL

## 2025-06-05 PROBLEM — J45.50 SEVERE PERSISTENT ASTHMA: Status: ACTIVE | Noted: 2025-06-05

## 2025-06-12 ENCOUNTER — TELEPHONE (OUTPATIENT)
Dept: FAMILY MEDICINE CLINIC | Facility: CLINIC | Age: 65
End: 2025-06-12

## 2025-06-12 NOTE — TELEPHONE ENCOUNTER
Attempted. Pt answered and told me she would call back.     HUB TO RELAY    Please let patient know that I saw her medical declaration form and I cannot sign until she has a TB skin test (or the blood test for TB) or has proof of a recent test. Will put forms in scan pile to be put into patients chart. Thanks

## 2025-06-12 NOTE — TELEPHONE ENCOUNTER
Please let patient know that I saw her medical declaration form and I cannot sign until she has a TB skin test (or the blood test for TB) or has proof of a recent test. Will put forms in scan pile to be put into patients chart. Thanks

## 2025-06-12 NOTE — TELEPHONE ENCOUNTER
Name: Stephani Ye    Relationship: Self    Best Callback Number: [phone]     HUB PROVIDED THE RELAY MESSAGE FROM THE OFFICE   PATIENT HAS FURTHER QUESTIONS AND WOULD LIKE A CALL BACK    ADDITIONAL INFORMATION: THE PATIENT REPORTS SHE HAD A QUANTIFERON GOLD TEST YESTERDAY, 06/11/2025, AT [facility].    PLEASE CALL AND ADVISE.

## 2025-06-16 DIAGNOSIS — E11.65 TYPE 2 DIABETES MELLITUS WITH HYPERGLYCEMIA, WITH LONG-TERM CURRENT USE OF INSULIN: ICD-10-CM

## 2025-06-16 DIAGNOSIS — Z79.4 TYPE 2 DIABETES MELLITUS WITH HYPERGLYCEMIA, WITH LONG-TERM CURRENT USE OF INSULIN: ICD-10-CM

## 2025-06-16 RX ORDER — PROCHLORPERAZINE 25 MG/1
SUPPOSITORY RECTAL
Qty: 3 EACH | Refills: 3 | Status: SHIPPED | OUTPATIENT
Start: 2025-06-16

## 2025-06-16 RX ORDER — PROCHLORPERAZINE 25 MG/1
1 SUPPOSITORY RECTAL DAILY
Qty: 1 EACH | Refills: 2 | Status: SHIPPED | OUTPATIENT
Start: 2025-06-16

## 2025-06-16 NOTE — TELEPHONE ENCOUNTER
Pt requested refill via [facility] for dexcom sensors.      These have been sent to [facility] in [place].

## 2025-06-24 NOTE — TELEPHONE ENCOUNTER
ERNIEM and [employer] message with provider message sent.  Per [employer] pt recently accessed [employer] TO RELAY     Please let patient know that her DEXA scan shows osteoporosis. Recommend she be taking a daily vitamin D and calcium supplement. 30 minutes weightbearing exercise also beneficial. There are medications such as Fosamax that I can prescribe To help slow down this process. Would she be interested in this? Let me know. It is recommended. Recommend recheck DEXA scan in 2 years. Thanks

## 2025-06-25 ENCOUNTER — TELEPHONE (OUTPATIENT)
Dept: FAMILY MEDICINE CLINIC | Facility: CLINIC | Age: 65
End: 2025-06-25

## 2025-06-25 RX ORDER — ALENDRONATE SODIUM 70 MG/1
70 TABLET ORAL
Qty: 12 TABLET | Refills: 1 | Status: SHIPPED | OUTPATIENT
Start: 2025-06-25

## 2025-06-25 NOTE — TELEPHONE ENCOUNTER
Fosamax sent. Give with water, take 30 minutes before first food/drink/med, avoid lying down for 30 minutes after taking.

## 2025-07-23 DIAGNOSIS — F51.01 PRIMARY INSOMNIA: ICD-10-CM

## 2025-07-23 RX ORDER — ZOLPIDEM TARTRATE 10 MG/1
10 TABLET ORAL
Qty: 30 TABLET | Refills: 2 | Status: SHIPPED | OUTPATIENT
Start: 2025-07-23